# Patient Record
Sex: FEMALE | Race: WHITE | Employment: OTHER | URBAN - METROPOLITAN AREA
[De-identification: names, ages, dates, MRNs, and addresses within clinical notes are randomized per-mention and may not be internally consistent; named-entity substitution may affect disease eponyms.]

---

## 2021-01-22 ENCOUNTER — IMMUNIZATION (OUTPATIENT)
Dept: PRIMARY CARE CLINIC | Age: 86
End: 2021-01-22
Payer: MEDICARE

## 2021-01-22 PROCEDURE — 0001A COVID-19, PFIZER VACCINE 30MCG/0.3ML DOSE: CPT | Performed by: FAMILY MEDICINE

## 2021-01-22 PROCEDURE — 91300 COVID-19, PFIZER VACCINE 30MCG/0.3ML DOSE: CPT | Performed by: FAMILY MEDICINE

## 2021-02-12 ENCOUNTER — IMMUNIZATION (OUTPATIENT)
Dept: PRIMARY CARE CLINIC | Age: 86
End: 2021-02-12
Payer: MEDICARE

## 2021-02-12 PROCEDURE — 0002A COVID-19, PFIZER VACCINE 30MCG/0.3ML DOSE: CPT | Performed by: FAMILY MEDICINE

## 2021-02-12 PROCEDURE — 91300 COVID-19, PFIZER VACCINE 30MCG/0.3ML DOSE: CPT | Performed by: FAMILY MEDICINE

## 2022-01-17 ENCOUNTER — APPOINTMENT (OUTPATIENT)
Dept: GENERAL RADIOLOGY | Age: 87
DRG: 563 | End: 2022-01-17
Payer: MEDICARE

## 2022-01-17 ENCOUNTER — APPOINTMENT (OUTPATIENT)
Dept: CT IMAGING | Age: 87
DRG: 563 | End: 2022-01-17
Payer: MEDICARE

## 2022-01-17 ENCOUNTER — HOSPITAL ENCOUNTER (INPATIENT)
Age: 87
LOS: 2 days | Discharge: HOME HEALTH | DRG: 563 | End: 2022-01-19
Attending: STUDENT IN AN ORGANIZED HEALTH CARE EDUCATION/TRAINING PROGRAM | Admitting: PEDIATRICS
Payer: MEDICARE

## 2022-01-17 DIAGNOSIS — S42.212A: ICD-10-CM

## 2022-01-17 DIAGNOSIS — R41.3 MEMORY PROBLEM: ICD-10-CM
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
## 2022-01-17 DIAGNOSIS — R26.81 GAIT INSTABILITY: ICD-10-CM

## 2022-01-17 PROBLEM — T14.8XXA BONE FRACTURE: Status: ACTIVE | Noted: 2022-01-17

## 2022-01-17 PROBLEM — S42.202A CLOSED FRACTURE OF PROXIMAL END OF LEFT HUMERUS, INITIAL ENCOUNTER: Status: ACTIVE | Noted: 2022-01-17

## 2022-01-17 LAB
ANION GAP SERPL CALCULATED.3IONS-SCNC: 13 MMOL/L (ref 3–16)
BASOPHILS ABSOLUTE: 0 K/UL (ref 0–0.2)
BASOPHILS RELATIVE PERCENT: 0.2 %
BILIRUBIN URINE: NEGATIVE
BLOOD, URINE: NEGATIVE
BUN BLDV-MCNC: 24 MG/DL (ref 7–20)
CALCIUM SERPL-MCNC: 9.3 MG/DL (ref 8.3–10.6)
CHLORIDE BLD-SCNC: 95 MMOL/L (ref 99–110)
CLARITY: CLEAR
CO2: 26 MMOL/L (ref 21–32)
COLOR: YELLOW
CREAT SERPL-MCNC: 0.9 MG/DL (ref 0.6–1.2)
EKG ATRIAL RATE: 65 BPM
EKG DIAGNOSIS: NORMAL
EKG P AXIS: 43 DEGREES
EKG P-R INTERVAL: 234 MS
EKG Q-T INTERVAL: 468 MS
EKG QRS DURATION: 126 MS
EKG QTC CALCULATION (BAZETT): 486 MS
EKG R AXIS: -59 DEGREES
EKG T AXIS: 85 DEGREES
EKG VENTRICULAR RATE: 65 BPM
EOSINOPHILS ABSOLUTE: 0.1 K/UL (ref 0–0.6)
EOSINOPHILS RELATIVE PERCENT: 1.1 %
GFR AFRICAN AMERICAN: >60
GFR NON-AFRICAN AMERICAN: 59
GLUCOSE BLD-MCNC: 115 MG/DL (ref 70–99)
GLUCOSE URINE: NEGATIVE MG/DL
HCT VFR BLD CALC: 36.2 % (ref 36–48)
HEMOGLOBIN: 11.8 G/DL (ref 12–16)
KETONES, URINE: NEGATIVE MG/DL
LEUKOCYTE ESTERASE, URINE: NEGATIVE
LYMPHOCYTES ABSOLUTE: 0.6 K/UL (ref 1–5.1)
LYMPHOCYTES RELATIVE PERCENT: 6.8 %
MCH RBC QN AUTO: 30.9 PG (ref 26–34)
MCHC RBC AUTO-ENTMCNC: 32.6 G/DL (ref 31–36)
MCV RBC AUTO: 94.8 FL (ref 80–100)
MICROSCOPIC EXAMINATION: NORMAL
MONOCYTES ABSOLUTE: 0.9 K/UL (ref 0–1.3)
MONOCYTES RELATIVE PERCENT: 9.9 %
NEUTROPHILS ABSOLUTE: 7.1 K/UL (ref 1.7–7.7)
NEUTROPHILS RELATIVE PERCENT: 82 %
NITRITE, URINE: NEGATIVE
PDW BLD-RTO: 16.3 % (ref 12.4–15.4)
PH UA: 7 (ref 5–8)
PLATELET # BLD: 265 K/UL (ref 135–450)
PMV BLD AUTO: 6.8 FL (ref 5–10.5)
POTASSIUM REFLEX MAGNESIUM: 3.8 MMOL/L (ref 3.5–5.1)
PROTEIN UA: NEGATIVE MG/DL
RBC # BLD: 3.82 M/UL (ref 4–5.2)
SODIUM BLD-SCNC: 134 MMOL/L (ref 136–145)
SPECIFIC GRAVITY UA: 1.02 (ref 1–1.03)
TROPONIN: <0.01 NG/ML
URINE REFLEX TO CULTURE: NORMAL
URINE TYPE: NORMAL
UROBILINOGEN, URINE: 1 E.U./DL
WBC # BLD: 8.7 K/UL (ref 4–11)

## 2022-01-17 PROCEDURE — 6370000000 HC RX 637 (ALT 250 FOR IP): Performed by: NURSE PRACTITIONER

## 2022-01-17 PROCEDURE — 99284 EMERGENCY DEPT VISIT MOD MDM: CPT

## 2022-01-17 PROCEDURE — 94640 AIRWAY INHALATION TREATMENT: CPT

## 2022-01-17 PROCEDURE — 2580000003 HC RX 258: Performed by: PEDIATRICS

## 2022-01-17 PROCEDURE — 93005 ELECTROCARDIOGRAM TRACING: CPT | Performed by: STUDENT IN AN ORGANIZED HEALTH CARE EDUCATION/TRAINING PROGRAM

## 2022-01-17 PROCEDURE — 81003 URINALYSIS AUTO W/O SCOPE: CPT

## 2022-01-17 PROCEDURE — 71045 X-RAY EXAM CHEST 1 VIEW: CPT

## 2022-01-17 PROCEDURE — 85025 COMPLETE CBC W/AUTO DIFF WBC: CPT

## 2022-01-17 PROCEDURE — 6370000000 HC RX 637 (ALT 250 FOR IP): Performed by: PEDIATRICS

## 2022-01-17 PROCEDURE — 80048 BASIC METABOLIC PNL TOTAL CA: CPT

## 2022-01-17 PROCEDURE — 99221 1ST HOSP IP/OBS SF/LOW 40: CPT | Performed by: ORTHOPAEDIC SURGERY

## 2022-01-17 PROCEDURE — 73060 X-RAY EXAM OF HUMERUS: CPT

## 2022-01-17 PROCEDURE — 70450 CT HEAD/BRAIN W/O DYE: CPT

## 2022-01-17 PROCEDURE — 84484 ASSAY OF TROPONIN QUANT: CPT

## 2022-01-17 PROCEDURE — 73030 X-RAY EXAM OF SHOULDER: CPT

## 2022-01-17 PROCEDURE — 6370000000 HC RX 637 (ALT 250 FOR IP): Performed by: STUDENT IN AN ORGANIZED HEALTH CARE EDUCATION/TRAINING PROGRAM

## 2022-01-17 PROCEDURE — 6360000002 HC RX W HCPCS: Performed by: PEDIATRICS

## 2022-01-17 PROCEDURE — 1200000000 HC SEMI PRIVATE

## 2022-01-17 PROCEDURE — 93010 ELECTROCARDIOGRAM REPORT: CPT | Performed by: INTERNAL MEDICINE

## 2022-01-17 PROCEDURE — 72125 CT NECK SPINE W/O DYE: CPT

## 2022-01-17 RX ORDER — SENNOSIDES 8.6 MG
650 CAPSULE ORAL DAILY
Status: DISCONTINUED | OUTPATIENT
Start: 2022-01-18 | End: 2022-01-17 | Stop reason: ALTCHOICE

## 2022-01-17 RX ORDER — HYDROCODONE BITARTRATE AND ACETAMINOPHEN 5; 325 MG/1; MG/1
1 TABLET ORAL ONCE
Status: COMPLETED | OUTPATIENT
Start: 2022-01-17 | End: 2022-01-17

## 2022-01-17 RX ORDER — ALBUTEROL SULFATE 90 UG/1
2 POWDER, METERED RESPIRATORY (INHALATION) 4 TIMES DAILY PRN
COMMUNITY
Start: 2021-12-18

## 2022-01-17 RX ORDER — HYDROCHLOROTHIAZIDE 12.5 MG/1
12.5 CAPSULE, GELATIN COATED ORAL DAILY
Status: DISCONTINUED | OUTPATIENT
Start: 2022-01-18 | End: 2022-01-19 | Stop reason: HOSPADM

## 2022-01-17 RX ORDER — FOLIC ACID 1 MG/1
1000 TABLET ORAL DAILY
COMMUNITY
Start: 2021-12-30

## 2022-01-17 RX ORDER — SODIUM CHLORIDE 9 MG/ML
25 INJECTION, SOLUTION INTRAVENOUS PRN
Status: DISCONTINUED | OUTPATIENT
Start: 2022-01-17 | End: 2022-01-19 | Stop reason: HOSPADM

## 2022-01-17 RX ORDER — HYDROCODONE BITARTRATE AND ACETAMINOPHEN 5; 325 MG/1; MG/1
1 TABLET ORAL EVERY 4 HOURS PRN
Status: DISCONTINUED | OUTPATIENT
Start: 2022-01-17 | End: 2022-01-19 | Stop reason: HOSPADM

## 2022-01-17 RX ORDER — MORPHINE SULFATE 2 MG/ML
2 INJECTION, SOLUTION INTRAMUSCULAR; INTRAVENOUS EVERY 4 HOURS PRN
Status: DISCONTINUED | OUTPATIENT
Start: 2022-01-17 | End: 2022-01-19 | Stop reason: HOSPADM

## 2022-01-17 RX ORDER — ACETAMINOPHEN 325 MG/1
650 TABLET ORAL EVERY 8 HOURS SCHEDULED
Status: DISCONTINUED | OUTPATIENT
Start: 2022-01-17 | End: 2022-01-19 | Stop reason: HOSPADM

## 2022-01-17 RX ORDER — ONDANSETRON 2 MG/ML
4 INJECTION INTRAMUSCULAR; INTRAVENOUS EVERY 6 HOURS PRN
Status: DISCONTINUED | OUTPATIENT
Start: 2022-01-17 | End: 2022-01-19 | Stop reason: HOSPADM

## 2022-01-17 RX ORDER — DOCUSATE SODIUM 100 MG/1
100 CAPSULE, LIQUID FILLED ORAL DAILY
Status: DISCONTINUED | OUTPATIENT
Start: 2022-01-17 | End: 2022-01-19 | Stop reason: HOSPADM

## 2022-01-17 RX ORDER — PREDNISONE 1 MG/1
2.5 TABLET ORAL EVERY OTHER DAY
Status: DISCONTINUED | OUTPATIENT
Start: 2022-01-18 | End: 2022-01-17

## 2022-01-17 RX ORDER — BUDESONIDE AND FORMOTEROL FUMARATE DIHYDRATE 160; 4.5 UG/1; UG/1
2 AEROSOL RESPIRATORY (INHALATION) 2 TIMES DAILY
Status: DISCONTINUED | OUTPATIENT
Start: 2022-01-17 | End: 2022-01-19 | Stop reason: HOSPADM

## 2022-01-17 RX ORDER — PREDNISONE 2.5 MG
2.5 TABLET ORAL EVERY OTHER DAY
Status: ON HOLD | COMMUNITY
Start: 2021-07-15 | End: 2022-10-14 | Stop reason: SDUPTHER

## 2022-01-17 RX ORDER — VITAMIN B COMPLEX
2000 TABLET ORAL DAILY
Status: DISCONTINUED | OUTPATIENT
Start: 2022-01-17 | End: 2022-01-19 | Stop reason: HOSPADM

## 2022-01-17 RX ORDER — ACETAMINOPHEN 325 MG/1
650 TABLET ORAL EVERY 6 HOURS PRN
Status: DISCONTINUED | OUTPATIENT
Start: 2022-01-17 | End: 2022-01-19 | Stop reason: HOSPADM

## 2022-01-17 RX ORDER — ACETAMINOPHEN 650 MG/1
650 SUPPOSITORY RECTAL EVERY 6 HOURS PRN
Status: DISCONTINUED | OUTPATIENT
Start: 2022-01-17 | End: 2022-01-19 | Stop reason: HOSPADM

## 2022-01-17 RX ORDER — ONDANSETRON 4 MG/1
4 TABLET, ORALLY DISINTEGRATING ORAL EVERY 8 HOURS PRN
Status: DISCONTINUED | OUTPATIENT
Start: 2022-01-17 | End: 2022-01-19 | Stop reason: HOSPADM

## 2022-01-17 RX ORDER — SODIUM CHLORIDE 0.9 % (FLUSH) 0.9 %
5-40 SYRINGE (ML) INJECTION EVERY 12 HOURS SCHEDULED
Status: DISCONTINUED | OUTPATIENT
Start: 2022-01-17 | End: 2022-01-19 | Stop reason: HOSPADM

## 2022-01-17 RX ORDER — LANOLIN ALCOHOL/MO/W.PET/CERES
6 CREAM (GRAM) TOPICAL NIGHTLY PRN
Status: DISCONTINUED | OUTPATIENT
Start: 2022-01-17 | End: 2022-01-19 | Stop reason: HOSPADM

## 2022-01-17 RX ORDER — PREDNISONE 1 MG/1
2.5 TABLET ORAL EVERY OTHER DAY
Status: DISCONTINUED | OUTPATIENT
Start: 2022-01-17 | End: 2022-01-19 | Stop reason: HOSPADM

## 2022-01-17 RX ORDER — ACETAMINOPHEN 650 MG/1
650 SUPPOSITORY RECTAL EVERY 4 HOURS PRN
COMMUNITY
End: 2022-01-17

## 2022-01-17 RX ORDER — POLYETHYLENE GLYCOL 3350 17 G/17G
17 POWDER, FOR SOLUTION ORAL DAILY PRN
Status: DISCONTINUED | OUTPATIENT
Start: 2022-01-17 | End: 2022-01-19 | Stop reason: HOSPADM

## 2022-01-17 RX ORDER — ALBUTEROL SULFATE 90 UG/1
2 AEROSOL, METERED RESPIRATORY (INHALATION) 4 TIMES DAILY PRN
Status: DISCONTINUED | OUTPATIENT
Start: 2022-01-17 | End: 2022-01-19 | Stop reason: HOSPADM

## 2022-01-17 RX ORDER — FOLIC ACID 1 MG/1
1000 TABLET ORAL DAILY
Status: DISCONTINUED | OUTPATIENT
Start: 2022-01-18 | End: 2022-01-19 | Stop reason: HOSPADM

## 2022-01-17 RX ORDER — SODIUM CHLORIDE 9 MG/ML
INJECTION, SOLUTION INTRAVENOUS CONTINUOUS
Status: ACTIVE | OUTPATIENT
Start: 2022-01-17 | End: 2022-01-18

## 2022-01-17 RX ORDER — SODIUM CHLORIDE 0.9 % (FLUSH) 0.9 %
5-40 SYRINGE (ML) INJECTION PRN
Status: DISCONTINUED | OUTPATIENT
Start: 2022-01-17 | End: 2022-01-19 | Stop reason: HOSPADM

## 2022-01-17 RX ORDER — SENNOSIDES 8.6 MG
1300 CAPSULE ORAL
COMMUNITY

## 2022-01-17 RX ORDER — HYDROCHLOROTHIAZIDE 12.5 MG/1
12.5 CAPSULE, GELATIN COATED ORAL DAILY
COMMUNITY
Start: 2022-01-06

## 2022-01-17 RX ADMIN — Medication 6 MG: at 21:03

## 2022-01-17 RX ADMIN — BUDESONIDE AND FORMOTEROL FUMARATE DIHYDRATE 2 PUFF: 160; 4.5 AEROSOL RESPIRATORY (INHALATION) at 19:56

## 2022-01-17 RX ADMIN — Medication 1 TABLET: at 20:09

## 2022-01-17 RX ADMIN — SODIUM CHLORIDE, PRESERVATIVE FREE 10 ML: 5 INJECTION INTRAVENOUS at 20:39

## 2022-01-17 RX ADMIN — DOCUSATE SODIUM 100 MG: 100 CAPSULE ORAL at 20:09

## 2022-01-17 RX ADMIN — ENOXAPARIN SODIUM 40 MG: 100 INJECTION SUBCUTANEOUS at 20:09

## 2022-01-17 RX ADMIN — PREDNISONE 2.5 MG: 5 TABLET ORAL at 21:03

## 2022-01-17 RX ADMIN — SODIUM CHLORIDE: 9 INJECTION, SOLUTION INTRAVENOUS at 20:30

## 2022-01-17 RX ADMIN — ACETAMINOPHEN 650 MG: 325 TABLET ORAL at 20:10

## 2022-01-17 RX ADMIN — Medication 2000 UNITS: at 20:09

## 2022-01-17 RX ADMIN — HYDROCODONE BITARTRATE AND ACETAMINOPHEN 1 TABLET: 5; 325 TABLET ORAL at 13:35

## 2022-01-17 RX ADMIN — METHOTREXATE 17.5 MG: 2.5 TABLET ORAL at 20:09

## 2022-01-17 RX ADMIN — HYDROCODONE BITARTRATE AND ACETAMINOPHEN 1 TABLET: 5; 325 TABLET ORAL at 17:18

## 2022-01-17 RX ADMIN — HYDROCODONE BITARTRATE AND ACETAMINOPHEN 1 TABLET: 5; 325 TABLET ORAL at 21:42

## 2022-01-17 ASSESSMENT — PAIN SCALES - GENERAL
PAINLEVEL_OUTOF10: 8
PAINLEVEL_OUTOF10: 0
PAINLEVEL_OUTOF10: 6
PAINLEVEL_OUTOF10: 8
PAINLEVEL_OUTOF10: 5
PAINLEVEL_OUTOF10: 6
PAINLEVEL_OUTOF10: 5
PAINLEVEL_OUTOF10: 0
PAINLEVEL_OUTOF10: 8

## 2022-01-17 ASSESSMENT — PAIN DESCRIPTION - PAIN TYPE
TYPE: ACUTE PAIN

## 2022-01-17 ASSESSMENT — PAIN DESCRIPTION - LOCATION
LOCATION: ARM

## 2022-01-17 ASSESSMENT — PAIN DESCRIPTION - ORIENTATION
ORIENTATION: LEFT

## 2022-01-17 ASSESSMENT — PAIN DESCRIPTION - ONSET
ONSET: ON-GOING
ONSET: ON-GOING

## 2022-01-17 ASSESSMENT — PAIN DESCRIPTION - DESCRIPTORS
DESCRIPTORS: ACHING
DESCRIPTORS: ACHING
DESCRIPTORS: DISCOMFORT

## 2022-01-17 ASSESSMENT — PAIN - FUNCTIONAL ASSESSMENT
PAIN_FUNCTIONAL_ASSESSMENT: PREVENTS OR INTERFERES SOME ACTIVE ACTIVITIES AND ADLS
PAIN_FUNCTIONAL_ASSESSMENT: PREVENTS OR INTERFERES SOME ACTIVE ACTIVITIES AND ADLS

## 2022-01-17 ASSESSMENT — PAIN DESCRIPTION - FREQUENCY
FREQUENCY: CONTINUOUS
FREQUENCY: CONTINUOUS

## 2022-01-17 ASSESSMENT — PAIN DESCRIPTION - PROGRESSION
CLINICAL_PROGRESSION: NOT CHANGED
CLINICAL_PROGRESSION: NOT CHANGED

## 2022-01-17 NOTE — ED NOTES
Report called to 3rd floor. All questions answered. Transport placed.       Alexis Metcalf RN  01/17/22 2276

## 2022-01-17 NOTE — PROGRESS NOTES
Pharmacy Medication Reconciliation Note     List of medications patient is currently taking is complete. Source of information:   1. Talked to the patient at bedside. 2. Dispensed records. Notes regarding home medications: 1. Patient took her tylenol 650 mg tab, hydrochlorthiazide 12.5 and ADVAIR prior to arrival in emergency department. 2. Patient takes her Methotrexate on Mondays - she has not taken it yet today.       Smiley Garcia, PharmD  1/17/2022  4:59 PM

## 2022-01-17 NOTE — ED NOTES
Fall risk screening completed. Fall risk bracelet applied to patient. Bed alarm on and activated. The fall risk is indicated using dome light . The call light is within the patient's reach and instructions for use were provided. The bed is in the lowest position with wheels locked. The patient has been advised to notify staff using the call light if there is a need to get up or use restroom. The patient verbalized understanding of safety precautions and how to contact staff for assistance.         Kirsten Chandler RN  01/17/22 9649

## 2022-01-17 NOTE — ED TRIAGE NOTES
pt to ED via squad from home with daughter, pt states she didnt  her foot enough and fell. c/o left arm pain. pt walks with a walker. pt was given 50 mcg fentanyl intranasally and 50 mcg IM.

## 2022-01-17 NOTE — CONSULTS
[facility] Orthopedic Surgery  Consult Note    This patient is seen in consultation at the request of Dr Efren Morris    Reason for Consult:  Left proximal humerus fx    CHIEF COMPLAINT:  Left shoulder pain    History Obtained From:  patient, electronic medical record    HISTORY OF PRESENT ILLNESS:    The patient is a 80 y.o. female who presents with left shoulder pain after a fall in her home today. She has poor recall of events. Daughter at bedside. . Pain is described in left shoulder and with the intensity of moderate. Pain is described as aching. Discomfort is persistent. She is alert and oriented. NO other complaints. ,     Past Medical History:    History reviewed. No pertinent past medical history. Past Surgical History:    History reviewed. No pertinent surgical history. Social History     Tobacco Use    Smoking status: Never Smoker    Smokeless tobacco: Never Used   Substance Use Topics    Alcohol use: Yes     Comment: occ       History reviewed. No pertinent family history. Current Medications:   No current facility-administered medications for this encounter. Allergies:  @    REVIEW OF SYSTEMS:    CONSTITUTIONAL:  negative for  fevers, chills and malaise  MUSCULOSKELETAL:  positive for  myalgias, arthralgias and pain  All other ROS reviewed in chart or with patient or family and are grossly negative. PHYSICAL EXAM:    VITALS:  /61   Pulse 74   Temp 98.2 °F (36.8 °C) (Oral)   Resp 16   SpO2 98%     MUSCULOSKELETAL: Left wrist with intact flex/ext and hand grasp and extension and thumbs up. Left shoulder with notable effusion, no gross deformity. NOntender right shoulder or bilateral elbows or wrist. Nontender bilateral knees and ankles.    NEUROLOGIC:   Sensory:    Touch:                     Right Upper Extremity:  normal                   Left Upper Extremity:  normal                  Skin warm and dry  Resp deep and easy  Abdomen soft and round  Pulse is with regular rate and rhythm    DATA:    CBC:   Lab Results   Component Value Date    WBC 8.7 01/17/2022    RBC 3.82 01/17/2022    HGB 11.8 01/17/2022    HCT 36.2 01/17/2022    MCV 94.8 01/17/2022    MCH 30.9 01/17/2022    MCHC 32.6 01/17/2022    RDW 16.3 01/17/2022     01/17/2022    MPV 6.8 01/17/2022     WBC:    Lab Results   Component Value Date    WBC 8.7 01/17/2022     PT/INR:  No results found for: PROTIME, INR  PTT:  No results found for: APTT[APTT    Radiology Review:    Narrative   EXAMINATION:   TWO XRAY VIEWS OF THE LEFT HUMERUS       1/17/2022 1:19 pm       COMPARISON:   Left shoulder of the same date. HISTORY:   ORDERING SYSTEM PROVIDED HISTORY: L humerus ttp s/p fall   TECHNOLOGIST PROVIDED HISTORY:   Reason for exam:->L humerus ttp s/p fall   Reason for Exam: L humerus ttp s/p fall       FINDINGS:   There is redemonstration of a left humeral neck fracture. No other humeral   fracture is identified. Diffuse osteopenia. No focal soft tissue abnormality           Impression   Left humeral neck fracture. No other acute humeral fracture identified.                IMPRESSION/RECOMMENDATIONS:    Fall at home  Left shoulder pain  Left proximal humerus fracture  Recommend nonoperative treatment for now  Plan NWB in sling left arm  No shoulder ROM for now  BID elbow, forearm, wrist, finger ROM exercises   Handout of exercises given  PT OT to see  SS for Arnoldo Ojeda 1485, APRN - CNP  1/17/2022  4:28 PM      SY Addendum:  I have seen and examined Joel Coreas at bedside  Family member present  Edits made to note and A/P above, otherwise I agree  FU in 1-2 weeks with myself in the office for repeat left shoulder XRs to ensure stability    Karime Gonzalez MD

## 2022-01-17 NOTE — H&P
Hospital Medicine History & Physical      PCP: Alfredo Buitrago    Date of Admission: 1/17/2022    Date of Service: Pt seen/examined on 01/17/22  and Admitted to Inpatient with expected LOS greater than two midnights due to medical therapy. Chief Complaint:  Fall       History Of Present Illness:      80 y.o. female who presented to [facility] with report that she fell down today - subsequently with left shoulder and arm pain, also head pain. She does not recall what time she fell down, nor does she recall the manner of the fall. But she also does not think that she was unconscious at any time. Her daughter reports that she fell down about 1030 this morning. She called her daughter for help - her daughter called am ambulance and she was brought to [facility] ED. Reports shoulder pain of 8/10. No blood in stool known. Denies dark or melanotic stools. Denies recent changes to medication regimen. RN in the ED concerned about her ability to swallow well as she was coughing after trying to take pills. FamHx:   - daughter recently recovering from covid     SocHx:   - does not drink   - rarely drinks alcohol   - lives with a daughter Cody Michael)       Past Medical History:      History reviewed. No pertinent past medical history. Past Surgical History:      History reviewed. No pertinent surgical history. Medications Prior to Admission:      Prior to Admission medications    Not on File       Allergies:  Hydrogen peroxide, Lisinopril, and Neomycin-bacitracin zn-polymyx    Social History:      The patient currently lives at home with her daughter     TOBACCO:   reports that she has never smoked. She has never used smokeless tobacco.  ETOH:   reports current alcohol use. E-Cigarettes/Vaping Use     Questions Responses    E-Cigarette/Vaping Use     Start Date     Passive Exposure     Quit Date     Counseling Given     Comments             Family History:         History reviewed.  No pertinent family history. REVIEW OF SYSTEMS COMPLETED:   Pertinent positives as noted in the HPI. All other systems reviewed and negative. PHYSICAL EXAM PERFORMED:    /61   Pulse 74   Temp 98.2 °F (36.8 °C) (Oral)   Resp 16   SpO2 98%     General appearance:  No apparent distress, appears stated age and cooperative. HEENT:  Normal cephalic, atraumatic without obvious deformity. Pupils equal, round, and reactive to light. Extra ocular muscles intact. Conjunctivae/corneas clear. Neck: Supple, with full range of motion. No jugular venous distention. Trachea midline. Respiratory:  Normal respiratory effort. Clear to auscultation, bilaterally without Rales/Wheezes/Rhonchi. Cardiovascular:  irRegular rate and rhythm with normal S1/S2 without murmurs, rubs or gallops. Abdomen: Soft, non-tender, non-distended    Musculoskeletal:  No clubbing, cyanosis or edema bilaterally. Left arm in a sling     Skin: Skin color, texture, turgor normal.  No rashes or lesions. Neurologic:    Speech clear   No facial droop  Moves ext x 4     Left arm in a sling   Psychiatric:  Alert and oriented   Capillary Refill: Brisk,3 seconds, normal  Peripheral Pulses: +2 palpable, equal bilaterally       Labs:     Recent Labs     01/17/22  1358   WBC 8.7   HGB 11.8*   HCT 36.2        Recent Labs     01/17/22  1358   *   K 3.8   CL 95*   CO2 26   BUN 24*   CREATININE 0.9   CALCIUM 9.3     No results for input(s): AST, ALT, BILIDIR, BILITOT, ALKPHOS in the last 72 hours. No results for input(s): INR in the last 72 hours. Recent Labs     01/17/22  1358   TROPONINI <0.01       Urinalysis:      Lab Results   Component Value Date    NITRU Negative 01/17/2022    BLOODU Negative 01/17/2022    SPECGRAV 1.019 01/17/2022    GLUCOSEU Negative 01/17/2022       Radiology:          XR SHOULDER LEFT (MIN 2 VIEWS)   Final Result   Acute left proximal humeral neck and head fracture. No dislocation.   Limited   positioning of the left shoulder likely due to patient factors         XR HUMERUS LEFT (MIN 2 VIEWS)   Final Result   Left humeral neck fracture. No other acute humeral fracture identified. XR CHEST PORTABLE   Final Result   Moderate interstitial fibrotic changes in the lungs. No pneumothorax. No displaced rib fractures. CT HEAD WO CONTRAST   Final Result   No acute intracranial abnormality. CT CERVICAL SPINE WO CONTRAST   Final Result   No acute abnormality of the cervical spine. ASSESSMENT:    Active Hospital Problems    Diagnosis Date Noted    Bone fracture [T14. Valene Folds 01/17/2022     Estephania Redmond is a 80 y.o. female   - daughter with concern for pain control advocated for admission       Left humerus fracture s/p fall:   - PT/OT   - ortho consulted   - norco 5-325 q4 hrs prn 1st line, morphine 2nd line   - docusate 100 daily with pain meds     Recurrent falls, in context of rheumatoid arthritis:   - in the context of rheumatoid arthritis - on methotrexate 17.5 mg po q week   - prednisone 2.5 mg po qod (next dose due today 1/17/22)   - PT/OT consulted   - CT head and C-spine 1/17 negative for acute abnormality   - fall precautions   - case management (discharge planning)     Hyponatremic / azotemia:   - probable dehydration (although low sodium maybe just from thiazide)   - IVF support (NS x 1000 ml, 100 ml/hr x 10 hrs)   - follow sodium and BUN     Hypertension:   - hctz 12.5 mg po daily    - maybe consider transitioning to amlodipine in the future - if issues with hyponatremia ongoing     Conduction, heart block, LBBB:   - telemetry x 24 hours due to fall     Normocytic anemia:   - hemoccult stool   - elevated RDW - check retic count, b12   - follow up CBC in AM   - does not know timing of last colonoscopy    - rheumatologst with plan to start iron per daughter's report     Probable CKD:   - est gfr of 59   - monitor cr / avoid nephrotoxins as able     Concern for dysphagia:   - wet cough s/p trying to swallow pills   - daughter reports she eats well - so SLP eval on hold, but low threshold to order if further concerns from RN staff   - denies hx of stroke     Probable MCI:   - daughter describes her as \"forgetful\" and expresses concerns about decline to her mental status if pain not controlled and if difficulty sleeping   - pain meds as noted above   - melatonin 6 mg po qhs prn sleep aid   - tried to explain limitations to visitor policy due to covid     DVT Prophylaxis: enoxaparin   Diet: No diet orders on file  Code Status:   DNR-CCA   - alternative decision maker - Hernando Abebe (daughter, POA)     PT/OT Eval Status: consulted     Dispo - pending improvement / placement        Torrie Cm MD    Thank you Sinan Alexander for the opportunity to be involved in this patient's care.

## 2022-01-18 LAB
ANION GAP SERPL CALCULATED.3IONS-SCNC: 11 MMOL/L (ref 3–16)
BUN BLDV-MCNC: 19 MG/DL (ref 7–20)
CALCIUM SERPL-MCNC: 8.8 MG/DL (ref 8.3–10.6)
CHLORIDE BLD-SCNC: 98 MMOL/L (ref 99–110)
CO2: 24 MMOL/L (ref 21–32)
CREAT SERPL-MCNC: 0.7 MG/DL (ref 0.6–1.2)
FERRITIN: 282.3 NG/ML (ref 15–150)
GFR AFRICAN AMERICAN: >60
GFR NON-AFRICAN AMERICAN: >60
GLUCOSE BLD-MCNC: 109 MG/DL (ref 70–99)
HCT VFR BLD CALC: 32.6 % (ref 36–48)
HEMOGLOBIN: 10.9 G/DL (ref 12–16)
IMMATURE RETIC FRACT: 0.37 (ref 0.21–0.37)
MCH RBC QN AUTO: 31.7 PG (ref 26–34)
MCHC RBC AUTO-ENTMCNC: 33.5 G/DL (ref 31–36)
MCV RBC AUTO: 94.6 FL (ref 80–100)
PDW BLD-RTO: 16.2 % (ref 12.4–15.4)
PLATELET # BLD: 216 K/UL (ref 135–450)
PMV BLD AUTO: 7.1 FL (ref 5–10.5)
POTASSIUM REFLEX MAGNESIUM: 3.8 MMOL/L (ref 3.5–5.1)
RBC # BLD: 3.45 M/UL (ref 4–5.2)
RETICULOCYTE ABSOLUTE COUNT: 0.04 M/UL (ref 0.02–0.1)
RETICULOCYTE COUNT PCT: 1.16 % (ref 0.5–2.18)
SODIUM BLD-SCNC: 133 MMOL/L (ref 136–145)
WBC # BLD: 7.3 K/UL (ref 4–11)

## 2022-01-18 PROCEDURE — 85027 COMPLETE CBC AUTOMATED: CPT

## 2022-01-18 PROCEDURE — 82728 ASSAY OF FERRITIN: CPT

## 2022-01-18 PROCEDURE — 97535 SELF CARE MNGMENT TRAINING: CPT

## 2022-01-18 PROCEDURE — 97530 THERAPEUTIC ACTIVITIES: CPT

## 2022-01-18 PROCEDURE — 2580000003 HC RX 258: Performed by: PEDIATRICS

## 2022-01-18 PROCEDURE — 85045 AUTOMATED RETICULOCYTE COUNT: CPT

## 2022-01-18 PROCEDURE — 1200000000 HC SEMI PRIVATE

## 2022-01-18 PROCEDURE — 6370000000 HC RX 637 (ALT 250 FOR IP): Performed by: NURSE PRACTITIONER

## 2022-01-18 PROCEDURE — 97162 PT EVAL MOD COMPLEX 30 MIN: CPT

## 2022-01-18 PROCEDURE — 94761 N-INVAS EAR/PLS OXIMETRY MLT: CPT

## 2022-01-18 PROCEDURE — 80048 BASIC METABOLIC PNL TOTAL CA: CPT

## 2022-01-18 PROCEDURE — 97116 GAIT TRAINING THERAPY: CPT

## 2022-01-18 PROCEDURE — 6360000002 HC RX W HCPCS: Performed by: PEDIATRICS

## 2022-01-18 PROCEDURE — 6370000000 HC RX 637 (ALT 250 FOR IP): Performed by: PEDIATRICS

## 2022-01-18 PROCEDURE — 97166 OT EVAL MOD COMPLEX 45 MIN: CPT

## 2022-01-18 PROCEDURE — 94640 AIRWAY INHALATION TREATMENT: CPT

## 2022-01-18 PROCEDURE — 36415 COLL VENOUS BLD VENIPUNCTURE: CPT

## 2022-01-18 PROCEDURE — 99231 SBSQ HOSP IP/OBS SF/LOW 25: CPT | Performed by: NURSE PRACTITIONER

## 2022-01-18 RX ORDER — HYDROCODONE BITARTRATE AND ACETAMINOPHEN 5; 325 MG/1; MG/1
1 TABLET ORAL EVERY 4 HOURS PRN
Qty: 30 TABLET | Refills: 0 | Status: SHIPPED | OUTPATIENT
Start: 2022-01-18 | End: 2022-01-23

## 2022-01-18 RX ADMIN — SODIUM CHLORIDE, PRESERVATIVE FREE 10 ML: 5 INJECTION INTRAVENOUS at 09:09

## 2022-01-18 RX ADMIN — HYDROCHLOROTHIAZIDE 12.5 MG: 12.5 CAPSULE ORAL at 09:08

## 2022-01-18 RX ADMIN — HYDROCODONE BITARTRATE AND ACETAMINOPHEN 1 TABLET: 5; 325 TABLET ORAL at 10:38

## 2022-01-18 RX ADMIN — SODIUM CHLORIDE, PRESERVATIVE FREE 10 ML: 5 INJECTION INTRAVENOUS at 21:12

## 2022-01-18 RX ADMIN — FOLIC ACID 1000 MCG: 1 TABLET ORAL at 09:07

## 2022-01-18 RX ADMIN — HYDROCODONE BITARTRATE AND ACETAMINOPHEN 1 TABLET: 5; 325 TABLET ORAL at 23:54

## 2022-01-18 RX ADMIN — ENOXAPARIN SODIUM 40 MG: 100 INJECTION SUBCUTANEOUS at 09:08

## 2022-01-18 RX ADMIN — Medication 1 TABLET: at 09:08

## 2022-01-18 RX ADMIN — HYDROCODONE BITARTRATE AND ACETAMINOPHEN 1 TABLET: 5; 325 TABLET ORAL at 15:25

## 2022-01-18 RX ADMIN — Medication 2000 UNITS: at 09:08

## 2022-01-18 RX ADMIN — ACETAMINOPHEN 650 MG: 325 TABLET ORAL at 19:49

## 2022-01-18 RX ADMIN — DOCUSATE SODIUM 100 MG: 100 CAPSULE ORAL at 09:07

## 2022-01-18 RX ADMIN — ACETAMINOPHEN 650 MG: 325 TABLET ORAL at 05:38

## 2022-01-18 RX ADMIN — BUDESONIDE AND FORMOTEROL FUMARATE DIHYDRATE 2 PUFF: 160; 4.5 AEROSOL RESPIRATORY (INHALATION) at 20:42

## 2022-01-18 RX ADMIN — HYDROCODONE BITARTRATE AND ACETAMINOPHEN 1 TABLET: 5; 325 TABLET ORAL at 03:29

## 2022-01-18 ASSESSMENT — PAIN DESCRIPTION - FREQUENCY
FREQUENCY: CONTINUOUS

## 2022-01-18 ASSESSMENT — PAIN DESCRIPTION - PAIN TYPE
TYPE: ACUTE PAIN

## 2022-01-18 ASSESSMENT — PAIN - FUNCTIONAL ASSESSMENT
PAIN_FUNCTIONAL_ASSESSMENT: PREVENTS OR INTERFERES SOME ACTIVE ACTIVITIES AND ADLS

## 2022-01-18 ASSESSMENT — PAIN SCALES - GENERAL
PAINLEVEL_OUTOF10: 4
PAINLEVEL_OUTOF10: 4
PAINLEVEL_OUTOF10: 0
PAINLEVEL_OUTOF10: 0
PAINLEVEL_OUTOF10: 6
PAINLEVEL_OUTOF10: 0
PAINLEVEL_OUTOF10: 2
PAINLEVEL_OUTOF10: 0
PAINLEVEL_OUTOF10: 5
PAINLEVEL_OUTOF10: 3
PAINLEVEL_OUTOF10: 3

## 2022-01-18 ASSESSMENT — PAIN DESCRIPTION - DESCRIPTORS
DESCRIPTORS: ACHING;DISCOMFORT
DESCRIPTORS: ACHING;DISCOMFORT
DESCRIPTORS: ACHING
DESCRIPTORS: ACHING;DISCOMFORT
DESCRIPTORS: ACHING;DISCOMFORT;SORE

## 2022-01-18 ASSESSMENT — PAIN DESCRIPTION - LOCATION
LOCATION: ARM
LOCATION: SHOULDER
LOCATION: ARM
LOCATION: SHOULDER

## 2022-01-18 ASSESSMENT — PAIN SCALES - WONG BAKER
WONGBAKER_NUMERICALRESPONSE: 2
WONGBAKER_NUMERICALRESPONSE: 0
WONGBAKER_NUMERICALRESPONSE: 2
WONGBAKER_NUMERICALRESPONSE: 0

## 2022-01-18 ASSESSMENT — PAIN DESCRIPTION - ORIENTATION
ORIENTATION: LEFT

## 2022-01-18 ASSESSMENT — PAIN DESCRIPTION - PROGRESSION
CLINICAL_PROGRESSION: GRADUALLY WORSENING
CLINICAL_PROGRESSION: NOT CHANGED
CLINICAL_PROGRESSION: GRADUALLY WORSENING
CLINICAL_PROGRESSION: GRADUALLY IMPROVING
CLINICAL_PROGRESSION: GRADUALLY IMPROVING
CLINICAL_PROGRESSION: NOT CHANGED
CLINICAL_PROGRESSION: GRADUALLY IMPROVING

## 2022-01-18 ASSESSMENT — PAIN DESCRIPTION - ONSET
ONSET: ON-GOING

## 2022-01-18 NOTE — CARE COORDINATION
[facility]    Referral received from CM to follow for home care services. I will follow for needs, and speak with patient to verify demos.     Brain Daily RN, BSN CTN  Critical access hospital [phone]

## 2022-01-18 NOTE — PROGRESS NOTES
Pt A&Ox3, disoriented to time. Pt resting in chair with family member at bedside. Tolerating intake and PO medications whole with thin liquids. NWB L arm; in sling. Call light within reach. Fall precautions in place. Will monitor per unit protocol.      Electronically signed by Dejan Perez RN on 1/18/2022 at 9:52 AM

## 2022-01-18 NOTE — ACP (ADVANCE CARE PLANNING)
Advance Care Planning     Advance Care Planning Activator (Inpatient)  Conversation Note      Date of ACP Conversation: 1/18/2022     Conversation Conducted with: Patient with Decision Making Capacity    ACP Activator: [address] Decision Maker:     Current Designated Health Care Decision Maker:     Primary Decision Maker: Darianyomaira Holguin 53 - [phone]    Secondary Decision Maker: Jose Miller - [facility] [place] - [phone]  Click here to complete Healthcare Decision Makers including section of the Healthcare Decision Maker Relationship (ie \"Primary\")      Care Preferences    Ventilation: \"If you were in your present state of health and suddenly became very ill and were unable to breathe on your own, what would your preference be about the use of a ventilator (breathing machine) if it were available to you? \"      Would the patient desire the use of ventilator (breathing machine)?: yes    \"If your health worsens and it becomes clear that your chance of recovery is unlikely, what would your preference be about the use of a ventilator (breathing machine) if it were available to you? \"     Would the patient desire the use of ventilator (breathing machine)?: No      Resuscitation  \"CPR works best to restart the heart when there is a sudden event, like a heart attack, in someone who is otherwise healthy. Unfortunately, CPR does not typically restart the heart for people who have serious health conditions or who are very sick. \"    \"In the event your heart stopped as a result of an underlying serious health condition, would you want attempts to be made to restart your heart (answer \"yes\" for attempt to resuscitate) or would you prefer a natural death (answer \"no\" for do not attempt to resuscitate)? \" yes       [x] Yes   [] No   Educated Patient / Velasquez Hintont regarding differences between Advance Directives and portable DNR orders.     Length of ACP Conversation in minutes: Conversation Outcomes:  [x] ACP discussion completed  [] Existing advance directive reviewed with patient; no changes to patient's previously recorded wishes  [] New Advance Directive completed  [] Portable Do Not Rescitate prepared for Provider review and signature  [] POLST/POST/MOLST/MOST prepared for Provider review and signature      Follow-up plan:    [] Schedule follow-up conversation to continue planning  [] Referred individual to Provider for additional questions/concerns   [] Advised patient/agent/surrogate to review completed ACP document and update if needed with changes in condition, patient preferences or care setting    [x] This note routed to one or more involved healthcare providers        Electronically signed by Sudha Iqbal on 1/18/2022 at 12:38 PM  #[phone]

## 2022-01-18 NOTE — PROGRESS NOTES
Physical Therapy    Facility/Department: [address] ORTHOPEDICS  Initial Assessment    NAME: Karen Norris  : 3/2/1933  MRN: [id number]    Date of Service: 2022    Assessment / Discharge Recommendations:  -if she has 24 hour assist and supervision anticipate able to discharge to home with Home PTOT   -daughter states 5 siblings having discussions. ..  -equipment needs: quad cane, bedside commode (Home OT to determine shower chair vs tub transfer bench)    -if not, will need continued PT OT and nursing care in a skilled setting   -she is a High Fall Risk     Karen Norris scored a  on the AM-PAC short mobility form. Body structures, Functions, Activity limitations: Decreased functional mobility ; Decreased balance;Decreased ADL status; Decreased endurance; Increased pain  Prognosis: Good  Decision Making: Medium Complexity  REQUIRES PT FOLLOW UP: Yes  Activity Tolerance  Activity Tolerance: Patient limited by endurance; Patient limited by pain       Patient Diagnosis(es): The primary encounter diagnosis was Fall, initial encounter. Diagnoses of Fracture of neck of left humerus, closed, initial encounter, Memory problem, and Gait instability were also pertinent to this visit. has a past medical history of Hypertension, Pulmonary fibrosis ([address] Rheumatoid arteritis ([facility] [address] and Vitamin D deficiency. has no past surgical history on file. Restrictions  Restrictions/Precautions  Restrictions/Precautions: Fall Risk  Position Activity Restriction  Other position/activity restrictions: NWB left UE   -sling left UE.  No shoulder ROM  Vision/Hearing  Vision: Impaired  Vision Exceptions: Wears glasses at all times  Hearing: Exceptions to [facility]  Hearing Exceptions: Hard of hearing/hearing concerns     Subjective  General  Chart Reviewed: Yes  Patient assessed for rehabilitation services?: Yes  Additional Pertinent Hx: here due to fall at home yielding left proximal humeral fracture    PMH includes RA and spinal stenosis  Response To Previous Treatment: Not applicable  Family / Caregiver Present: Yes (daughter)  Follows Commands: Within Functional Limits  Subjective  Subjective: arrived to room to patient eating her breakfast -she is alert and oriented to self and general situation  - agreeable to PT OT assessments and OOB to demonstrate her mobility  - rates pain at rest at ~3/10  Social/Functional History  Social/Functional History  Lives With: Daughter  Type of Home: House  Home Layout: Multi-level,Able to Live on Main level with bedroom/bathroom (bed and bath on main level. kitchen and living space up 3 steps)  Home Access: Stairs to enter without rails  Entrance Stairs - Number of Steps: 3+1+1+3 or full flight from basement to main level with one rail (R)  Entrance Stairs - Rails: None  Bathroom Shower/Tub: Tub/Shower unit,Walk-in shower  Bathroom Toilet: Standard  Home Equipment: Cane,Rolling walker  ADL Assistance: Independent  Homemaking Assistance: Needs assistance (dtr does most. pt empty dishes)  Ambulation Assistance: Independent (cane PRN (RW if back is bothering her). Holds onto family in community)  Transfer Assistance: Independent  Additional Comments: fall brought here. No other fall. sleeps in regular bed  Objective  ROM and strength uninvolved limbs grossly functional as observed mobilizing from bed   Motor Control  Gross Motor?: WFL  Sensation  Overall Sensation Status:  (grossly wfl - not assessed formally in distal LEs)  Bed mobility  Supine to Sit: Moderate assistance (use of bed rail)  Sit to Supine: Unable to assess  Scooting:  Moderate assistance  Transfers  Sit to Stand: Minimal Assistance  Stand to sit: Minimal Assistance  Ambulation  Ambulation?: Yes  Ambulation 1  Surface: level tile  Device: Small Austen Ramiro  Assistance: Minimal assistance  Quality of Gait: wide base of support but with small step length and limited step height  Distance: ~40 feet overall (~20 feet x 2)  Comments: unsteady  Stairs/Curb  Stairs?: No  Balance  Comments: midline in sitting at the EOB and in static stance with lean on the quad cane and hands on min assist  Plan   Plan  Times per week: 3-5 while on acute floor  Current Treatment Recommendations: Transfer Training,ADL/Self-care Training,Patient/Caregiver Education & Training,Modalities,Gait Training,Positioning  Safety Devices  Type of devices:  All fall risk precautions in place,Call light within reach,Chair alarm in place,Left in chair,Nurse notified Edward Breen)    AM-PAC Score  AM-PAC Inpatient Mobility Raw Score : 14 (01/18/22 0840)  AM-PAC Inpatient T-Scale Score : 38.1 (01/18/22 0840)  Mobility Inpatient CMS 0-100% Score: 61.29 (01/18/22 0840)  Mobility Inpatient CMS G-Code Modifier : CL (01/18/22 0840)    Goals  Short term goals  Time Frame for Short term goals: 1-2 days  Short term goal 1: bed mobility at min/mod assist  Short term goal 2: transfers at cga/min assist  Short term goal 3: ambulation at cga/min for room distances using quad cane  Patient Goals   Patient goals : wants to go home       Therapy Time   Individual Concurrent Group Co-treatment   Time In 0745         Time Out 0840         Minutes 2050 Prosper Pinto, PT

## 2022-01-18 NOTE — PROGRESS NOTES
Pt awake in chair. Chair alarm on. Ice reapplied to L shoulder. Pain level assessed and medication given per MAR. Will reassess pain per protocol. Son at bedside, and will stay overnight. Call light within reach. Fall precautions in place. Will monitor per unit protocol.      Electronically signed by Crista Gottlieb RN on 1/18/2022 at 3:33 PM

## 2022-01-18 NOTE — PROGRESS NOTES
[facility] Orthopedic Surgery   Progress Note      S/P :  SUBJECTIVE  Up in chair. Alert and oriented to self, No recall of fall or yesterday. Daughter remained at bedside all night. . Pain is   described in left shoulder and with the intensity of moderate. Pain is described as aching. OBJECTIVE              Physical                      VITALS:  BP (!) 155/67   Pulse 61   Temp 97.4 °F (36.3 °C) (Oral)   Resp 19   Ht 5' 3\" (1.6 m)   Wt 152 lb 8.9 oz (69.2 kg)   SpO2 95%   BMI 27.02 kg/m²                     MUSCULOSKELETAL:Left wrist with intact flex/ext and hand grasp and extension and thumbs up   Left shoulder soft and swollen, no gross bruising or deformity noted.  Left arm in sling                NEUROLOGIC:                                  Sensory:  Touch:  Left Upper Extremity:  normal                                      Data       CBC:   Lab Results   Component Value Date    WBC 7.3 01/18/2022    RBC 3.45 01/18/2022    HGB 10.9 01/18/2022    HCT 32.6 01/18/2022    MCV 94.6 01/18/2022    MCH 31.7 01/18/2022    MCHC 33.5 01/18/2022    RDW 16.2 01/18/2022     01/18/2022    MPV 7.1 01/18/2022        WBC:    Lab Results   Component Value Date    WBC 7.3 01/18/2022        Hemoglobin/Hematocrit:    Lab Results   Component Value Date    HGB 10.9 01/18/2022    HCT 32.6 01/18/2022        PT/INR:  No results found for: PROTIME, INR           Current Inpatient Medications             Current Facility-Administered Medications: sodium chloride flush 0.9 % injection 5-40 mL, 5-40 mL, IntraVENous, 2 times per day  sodium chloride flush 0.9 % injection 5-40 mL, 5-40 mL, IntraVENous, PRN  0.9 % sodium chloride infusion, 25 mL, IntraVENous, PRN  enoxaparin (LOVENOX) injection 40 mg, 40 mg, SubCUTAneous, Daily  ondansetron (ZOFRAN-ODT) disintegrating tablet 4 mg, 4 mg, Oral, Q8H PRN **OR** ondansetron (ZOFRAN) injection 4 mg, 4 mg, IntraVENous, Q6H PRN  polyethylene glycol (GLYCOLAX) packet 17 g, 17 g, Oral, Daily PRN  acetaminophen (TYLENOL) tablet 650 mg, 650 mg, Oral, Q6H PRN **OR** acetaminophen (TYLENOL) suppository 650 mg, 650 mg, Rectal, Q6H PRN  HYDROcodone-acetaminophen (NORCO) 5-325 MG per tablet 1 tablet, 1 tablet, Oral, Q4H PRN  morphine (PF) injection 2 mg, 2 mg, IntraVENous, Q4H PRN  docusate sodium (COLACE) capsule 100 mg, 100 mg, Oral, Daily  acetaminophen (TYLENOL) tablet 650 mg, 650 mg, Oral, 3 times per day  HYDROcodone-acetaminophen (NORCO) 5-325 MG per tablet 1 tablet, 1 tablet, Oral, Q4H PRN  melatonin tablet 6 mg, 6 mg, Oral, Nightly PRN  albuterol sulfate  (90 Base) MCG/ACT inhaler 2 puff, 2 puff, Inhalation, 4x Daily PRN  budesonide-formoterol (SYMBICORT) 160-4.5 MCG/ACT inhaler 2 puff, 2 puff, Inhalation, BID  folic acid (FOLVITE) tablet 1,000 mcg, 1,000 mcg, Oral, Daily  hydroCHLOROthiazide (MICROZIDE) capsule 12.5 mg, 12.5 mg, Oral, Daily  methotrexate (RHEUMATREX) chemo tablet 17.5 mg, 17.5 mg, Oral, Weekly  vitamin D (CHOLECALCIFEROL) tablet 2,000 Units, 2,000 Units, Oral, Daily  calcium-cholecalciferol 500-200 MG-UNIT per tablet 1 tablet, 1 tablet, Oral, Daily  predniSONE (DELTASONE) tablet 2.5 mg, 2.5 mg, Oral, Every Other Day    ASSESSMENT AND PLAN    Fall at home  Left shoulder pain  Left proximal humerus fracture  Recommend nonoperative treatment for now  Plan NWB in sling left arm  No shoulder ROM for now  BID elbow, forearm, wrist, finger ROM exercises              Handout of exercises given  PT OT to see  SS for DC planning, home with home care and family attendance 24/7 per daughter request.   Memory loss, this may be baseline  Followup Dr Abe Woody in office in1-2 weeks.      José Daniels, MARSHA - CNP  1/18/2022  9:53 AM

## 2022-01-18 NOTE — PLAN OF CARE
Problem: Falls - Risk of:  Goal: Will remain free from falls  Description: Will remain free from falls  1/18/2022 0733 by Edgar Landin RN  Outcome: Ongoing   Fall precautions in place. Bed in lowest position. Gripper socks on. Call light in reach. Problem: Pain:  Goal: Control of acute pain  Description: Control of acute pain  1/18/2022 0733 by Edgar Landin RN  Outcome: Ongoing   Will assess pain level and administer medications per [facility]    Problem: Skin Integrity:  Goal: Absence of new skin breakdown  Description: Absence of new skin breakdown  1/18/2022 0733 by Edgar Landin RN  Outcome: Ongoing

## 2022-01-18 NOTE — PROGRESS NOTES
Hospitalist Progress Note      PCP: Ekaterina Sy    Date of Admission: 1/17/2022    Chief Complaint: [facility] Course: 80-year-old female admitted to the hospital status post fall  Found to have left humeral fracture admitted for care  Patient has prednisone dependent RA  Also treated for dehydration    Subjective: Doing okay. Good spirits. Family is present  Working with therapy    Medications:  Reviewed    Infusion Medications    sodium chloride       Scheduled Medications    sodium chloride flush  5-40 mL IntraVENous 2 times per day    enoxaparin  40 mg SubCUTAneous Daily    docusate sodium  100 mg Oral Daily    acetaminophen  650 mg Oral 3 times per day    budesonide-formoterol  2 puff Inhalation BID    folic acid  2,529 mcg Oral Daily    hydroCHLOROthiazide  12.5 mg Oral Daily    methotrexate  17.5 mg Oral Weekly    Vitamin D  2,000 Units Oral Daily    calcium-cholecalciferol  1 tablet Oral Daily    predniSONE  2.5 mg Oral Every Other Day     PRN Meds: sodium chloride flush, sodium chloride, ondansetron **OR** ondansetron, polyethylene glycol, acetaminophen **OR** acetaminophen, HYDROcodone 5 mg - acetaminophen, morphine, HYDROcodone 5 mg - acetaminophen, melatonin, albuterol sulfate HFA      Intake/Output Summary (Last 24 hours) at 1/18/2022 1635  Last data filed at 1/18/2022 1327  Gross per 24 hour   Intake 1090 ml   Output 300 ml   Net 790 ml       Physical Exam Performed:    /67   Pulse 80   Temp 98 °F (36.7 °C) (Oral)   Resp 16   Ht 5' 3\" (1.6 m)   Wt 152 lb 8.9 oz (69.2 kg)   SpO2 95%   BMI 27.02 kg/m²     General appearance: No apparent distress, appears stated age and cooperative. HEENT: Pupils equal, round, and reactive to light. Conjunctivae/corneas clear. Neck: Supple, with full range of motion. No jugular venous distention. Trachea midline. Respiratory:  Normal respiratory effort.  Clear to auscultation, bilaterally without Rales/Wheezes/Rhonchi. Cardiovascular: Regular rate and rhythm with normal S1/S2 without murmurs, rubs or gallops. Abdomen: Soft, non-tender, non-distended with normal bowel sounds. Musculoskeletal: No clubbing, cyanosis or edema bilaterally. Full range of motion without deformity. Skin: Skin color, texture, turgor normal.  No rashes or lesions. Neurologic:  Neurovascularly intact without any focal sensory/motor deficits. Cranial nerves: II-XII intact, grossly non-focal.  Psychiatric: Alert and oriented, thought content appropriate, normal insight  Capillary Refill: Brisk,3 seconds, normal   Peripheral Pulses: +2 palpable, equal bilaterally       Labs:   Recent Labs     01/17/22  1358 01/18/22  0531   WBC 8.7 7.3   HGB 11.8* 10.9*   HCT 36.2 32.6*    216     Recent Labs     01/17/22  1358 01/18/22  0531   * 133*   K 3.8 3.8   CL 95* 98*   CO2 26 24   BUN 24* 19   CREATININE 0.9 0.7   CALCIUM 9.3 8.8     No results for input(s): AST, ALT, BILIDIR, BILITOT, ALKPHOS in the last 72 hours. No results for input(s): INR in the last 72 hours. Recent Labs     01/17/22  1358   TROPONINI <0.01       Urinalysis:      Lab Results   Component Value Date    NITRU Negative 01/17/2022    BLOODU Negative 01/17/2022    SPECGRAV 1.019 01/17/2022    GLUCOSEU Negative 01/17/2022       Radiology:  XR SHOULDER LEFT (MIN 2 VIEWS)   Final Result   Acute left proximal humeral neck and head fracture. No dislocation. Limited   positioning of the left shoulder likely due to patient factors         XR HUMERUS LEFT (MIN 2 VIEWS)   Final Result   Left humeral neck fracture. No other acute humeral fracture identified. XR CHEST PORTABLE   Final Result   Moderate interstitial fibrotic changes in the lungs. No pneumothorax. No displaced rib fractures. CT HEAD WO CONTRAST   Final Result   No acute intracranial abnormality.          CT CERVICAL SPINE WO CONTRAST   Final Result   No acute abnormality of the cervical spine. Assessment/Plan:    Left humeral fracture-  Nonoperative  Continue sling of the left arm  Per Ortho no shoulder range of motion for now  Receiving elbow forearm wrist finger range of motion exercises with close follow-up with Ortho in 1 to 2 weeks  Consultation and recommendations greatly appreciated  Continue another day of therapy I am concerned about safety with discharge today. Nursing was in agreement along with family      Dehydration-resolved I have discontinued all IV fluids  Encourage oral fluids      Rheumatoid arthritis-stable resume home prednisone      Hypertension-stable continue hydrochlorothiazide    DVT Prophylaxis: Lovenox  Diet: ADULT DIET;  Regular  Code Status: Full Code    PT/OT Eval Status: Ordered and ongoing    Kenney Wise MD

## 2022-01-18 NOTE — CARE COORDINATION
INITIAL CASE MANAGEMENT ASSESSMENT    Reviewed chart, met with patient to assess possible discharge needs. Explained Case Management role/services. Living Situation: lives at home in a split level house. There are 3+1+1+3 LILIAM- can manage on one level   Her daughter Johanna Flores lives with her but does work so is not home all the time    ADLs: independent     DME: RW,cane- but states she does not use  Per PT/OT recs- pt will need BSC and small base quad cane- daughter BODØ states she will order a [facility] and is agreeable to referral to [facility] for cane -- Gail Joshi w/ [facility] [employer]    PT/OT Recs:    Discharge Recommendations:  2-3 sessions per week,24 hour supervision or assist,Home with [address] [place] [address] would benefit from continued therapy after discharge,3-5 sessions per week  OT Equipment Recommendations  Equipment Needed: Yes (family to purchase [facility])  Mobility Devices: Canes  Cane: [employer]    Active Services: none     Transportation: daughter can transport at [place]     Medications: no issues    PCP: Dr Colton Delacruz      HD/PD: n/a    PLAN/COMMENTS: patient plans to return home at [place]- she will either return to her own home with her family taking turns staying with her OR she may go stay with her daughter DOLORES-- will need to verify address for home care. Patient agreeable to home care and she prefers [facility] --referral made to Shelby mejia/ [facility] [address] for Transition of Care is related to the following treatment goals: home with home care     The Patient and/or patient representative  was provided with a choice of provider and agrees   with the discharge plan. [x] Yes [] No    Freedom of choice list was provided with basic dialogue that supports the patient's individualized plan of care/goals, treatment preferences and shares the quality data associated with the providers. [x] Yes [] No    SW/CM provided contact information for patient or family to call with any questions.   SW/CM will follow and assist as needed.   Electronically signed by Chana Morin on 1/18/2022 at 12:47 PM  #[phone]    1/18 received call from daughter David Mari is for patient to come and stay at her home   Address is :  [address] [place] [address]  Daughter Law Ordaz is contact # [phone]    Family is buying a BSC- will need cane from [employer]   Electronically signed by Chana Morin on 1/18/2022 at 4:34 PM

## 2022-01-18 NOTE — PROGRESS NOTES
Patient is resting in bed. Alert and oriented X3. IV in place and infusing. LUE in a sling, NWB. Patient complaining of pain. PRN pain medication given per order (see MAR). Assessment complete. All patient needs are met at this time. Fall precautions are in place. Call light is in reach. Will continue to monitor.    Electronically signed by Marianela Gustafson RN on 1/18/2022 at 1:14 AM

## 2022-01-18 NOTE — PROGRESS NOTES
Occupational Therapy   Occupational Therapy Initial Assessment  Date: 2022   Patient Name: Honorio Sanders  MRN: [id number]     : 3/2/1933    Date of Service: 2022    Discharge Recommendations:  2-3 sessions per week,24 hour supervision or assist,Home with [address] [place] [address] would benefit from continued therapy after discharge,3-5 sessions per week  OT Equipment Recommendations  Equipment Needed: Yes (family to purchase [facility])  Mobility Devices: Canes  Cane: [employer] scored a 15/24 on the AM-PAC ADL Inpatient form. Current research shows that an AM-PAC score of 18 or greater is typically associated with a discharge to the patient's home setting. Based on the patient's AM-PAC score, and their current ADL deficits, it is recommended that the patient have 2-3 sessions per week of Occupational Therapy at d/c to increase the patient's independence. At this time, this patient demonstrates the endurance and safety to discharge home with [address] [place] [address] (home vs OP services) and a follow up treatment frequency of 2-3x/wk. Please see assessment section for further patient specific details. If patient discharges prior to next session this note will serve as a discharge summary. Please see below for the latest assessment towards goals. Assessment   Performance deficits / Impairments: Decreased functional mobility ; Decreased safe awareness;Decreased balance;Decreased ADL status; Decreased ROM; Decreased endurance;Decreased high-level IADLs;Decreased strength  Assessment: 81 yo female admitted  for a fall resulting in L proximal humerus fx. Now NWB with sling and no shoulder ROM. PMH: Spinal stenosis, RA. PTA, pt lives with dtr with assist for IADLs, otherwise, pt independent with ADL and fxl mobility (cane PRN and furniture surfing). Today, pt functioning well below baseline most limited by L shoulder pain and decreased balance.  Pt required Mod A bed mobility, and Min A for fxl tx and mobility with quad cane household distances with unsteadiness and fatigue. Pt completes toileting Marianela, LB dressing Max A, and seated grooming SBA. Anticipate Mod/Max A LB and Min A UB ADL based on balance, endurance, cognition, pain and PLOF. Pt and dtr educated on seated ADLs, need for [facility] for safe toilet tx, sponge bathing, UB dressing, sling alignment, sleep positioning, need for 24 hr assist, and importance of LUE exercises (not shoulder) (too painful/fatigued to attempt at this time). Cont acute OT to address above deficits. Pt and family hoping for return home between 5 siblings and hired help- pt could safely return home with 24 hr hands on assist, Bed side commode, quad cane, and OT 2-3x/week front load to facilitate safe d/c home. If 24 hr hands on assist unavailable, rec OT 3-5x/week  Treatment Diagnosis: impaired ADL/fxl mobility  Prognosis: Fair  Decision Making: Medium Complexity  OT Education: OT Role;Transfer Training;Plan of Care;Precautions; ADL Adaptive Strategies  Patient Education: Pt and dtr educated on seated ADLs, need for [facility] for safe toilet tx, sponge bathing, UB dressing, sling alignment, sleep positioning, need for 24 hr assist, and importance of LUE exercises (not shoulder) (too painful/fatigued to attempt at this time)  REQUIRES OT FOLLOW UP: Yes  Activity Tolerance  Activity Tolerance: Patient limited by pain; Patient limited by fatigue  Safety Devices  Safety Devices in place: Yes  Type of devices: Nurse notified;Gait belt;Call light within reach; Chair alarm in place; Left in chair;Patient at risk for falls         Patient Diagnosis(es): The primary encounter diagnosis was Fall, initial encounter. Diagnoses of Fracture of neck of left humerus, closed, initial encounter, Memory problem, and Gait instability were also pertinent to this visit. has a past medical history of Hypertension, Pulmonary fibrosis ([address] Rheumatoid arteritis ([facility] [address] and Vitamin D deficiency.    has no past surgical history on file. Treatment Diagnosis: impaired ADL/fxl mobility      Restrictions  Restrictions/Precautions  Restrictions/Precautions: Fall Risk  Position Activity Restriction  Other position/activity restrictions: NWB left UE   -sling left UE. No shoulder ROM    Subjective   General  Chart Reviewed: Yes  Patient assessed for rehabilitation services?: Yes  Additional Pertinent Hx: 81 yo female admitted 1/17 for a fall resulting in L proximal humerus fx. Now NWB with sling and no shoulder ROM. PMH: Spinal stenosis, RA  Diagnosis: L proximal humerus fx  Subjective  Subjective: Pt resting in bed upon arrival and agreeable to OT/PT eval. Pt reporting 1-2/10 pain in L shoulder  General Comment  Comments: RN ok to see  Vital Signs  Temp: 97.4 °F (36.3 °C)  Temp Source: Oral  Pulse: 61  Heart Rate Source: Monitor  Resp: 19  BP: (!) 155/67  BP Location: Right Arm  MAP (mmHg): 97  Level of Consciousness: Alert (0)  MEWS Score: 1  Oxygen Therapy  SpO2: 95 %  Pulse Oximeter Device Mode: Intermittent  O2 Device: None (Room air)    Social/Functional History  Social/Functional History  Lives With: Daughter  Type of Home: House  Home Layout: Multi-level,Able to Live on Main level with bedroom/bathroom (bed and bath on main level. kitchen and living space up 3 steps)  Home Access: Stairs to enter without rails  Entrance Stairs - Number of Steps: 3+1+1+3 or full flight from basement to main level with one rail (R)  Entrance Stairs - Rails: None  Bathroom Shower/Tub: Tub/Shower unit,Walk-in shower  Bathroom Toilet: Standard  Home Equipment: Cane,Rolling walker  ADL Assistance: Independent  Homemaking Assistance: Needs assistance (dtr does most. pt empty dishes)  Ambulation Assistance: Independent (cane PRN (RW if back is bothering her). Holds onto family in community)  Transfer Assistance: Independent  Additional Comments: fall brought here.  No other fall. sleeps in regular bed       Objective   Vision: Impaired  Vision Exceptions: Wears glasses at all times  Hearing: Within functional limits    Orientation  Overall Orientation Status: Impaired  Orientation Level: Oriented to person;Oriented to situation;Oriented to place; Disoriented to time  Observation/Palpation  Observation: LUE in sling- adjusted  Balance  Sitting Balance: Stand by assistance  Standing Balance: Contact guard assistance (for pant management)  Functional Mobility  Functional - Mobility Device:  (quad cane)  Activity:  (EOB>bathroom>doors of bathroom into stedy then to recliner)  Assist Level: Minimal assistance  Functional Mobility Comments: min-moderate unsteadiness no LOB. Forward flexed posture. Toilet Transfers  Toilet - Technique: Ambulating (quad cane)  Toilet Transfer: Minimal assistance  ADL  Grooming: Stand by assistance (seated hand washing)  LE Dressing: Maximum assistance (assist with threading and partial pants over hips)  Toileting: Minimal assistance (assist pants over hips)  Additional Comments: Anticipate Mod/Max A LB and Min A UB ADL based on balance, endurance, cognition, pain and PLOF  Tone RUE  RUE Tone: Normotonic  Tone LUE  LUE Tone: Normotonic  Coordination  Movements Are Fluid And Coordinated: Yes     Bed mobility  Supine to Sit: Moderate assistance (use of bed rail)  Sit to Supine: Unable to assess  Scooting: Moderate assistance  Transfers  Sit to stand: Minimal assistance  Stand to sit: Minimal assistance  Transfer Comments: Quad cane. cues     Cognition  Overall Cognitive Status: Exceptions  Following Commands:  Follows one step commands consistently  Attention Span: Attends with cues to redirect  Safety Judgement: Decreased awareness of need for safety  Problem Solving: Decreased awareness of errors  Insights: Decreased awareness of deficits  Initiation: Requires cues for some  Sequencing: Requires cues for some        Sensation  Overall Sensation Status: WFL        RUE AROM (degrees)  RUE AROM : WFL  LUE Strength  LUE Strength Comment: RICARDA- sling/fx  RUE Strength  Gross RUE Strength: WFL                Plan   Plan  Times per week: 3-5  Current Treatment Recommendations: Strengthening,Endurance Training,Patient/Caregiver Education & Training,ROM,Self-Care / ADL,Balance Training,Pain Management,Functional Mobility Training,Safety Education & Training,Positioning    AM-PAC Score        AM-PAC Inpatient Daily Activity Raw Score: 15 (01/18/22 0903)  AM-PAC Inpatient ADL T-Scale Score : 34.69 (01/18/22 0903)  ADL Inpatient CMS 0-100% Score: 56.46 (01/18/22 0903)  ADL Inpatient CMS G-Code Modifier : CK (01/18/22 0903)    Goals  Short term goals  Time Frame for Short term goals: prior to d/c  Short term goal 1: toileting CGA  Short term goal 2: complete LUE AROM (wrist, forearm, elbow) out of sling (not shoulder) to decreased swelling and stiffness in joints for ADLs  Short term goal 3: LB dressing Mod A  Short term goal 4: UB dressing and sling management with assist from family with Mod A  Short term goal 5: Tolreate 3 min fxl standing task CGA  Patient Goals   Patient goals : return home       Therapy Time   Individual Concurrent Group Co-treatment   Time In 0745         Time Out 0845         Minutes 60         Timed Code Treatment Minutes: 45 Minutes (15 eval. 30 ADL.  15 TA)       HARPREET Palomino, OTR/L

## 2022-01-18 NOTE — PROGRESS NOTES
4 Eyes Skin Assessment     NAME:  Karen Norris  YOB: 1933  MEDICAL RECORD NUMBER:  [id number]    The patient is being assess for  Admission    I agree that 2 RN's have performed a thorough Head to Toe Skin Assessment on the patient. ALL assessment sites listed below have been assessed. Areas assessed by both nurses:    Head, Face, Ears, Shoulders, Back, Chest, Arms, Elbows, Hands, Sacrum. Buttock, Coccyx, Ischium and Legs. Feet and Heels        Does the Patient have a Wound?  No noted wound(s)       Jori Prevention initiated:  yes  Wound Care Orders initiated:  No    Pressure Injury (Stage 3,4, Unstageable, DTI, NWPT, and Complex wounds) if present place consult order under [de-identified] No    New and Established Ostomies if present place consult order under : No      Nurse 1 eSignature: Electronically signed by Yarelis Pascual RN on 1/18/22 at 1:12 AM EST    **SHARE this note so that the co-signing nurse is able to place an eSignature**    Nurse 2 eSignature: Electronically signed by Jami Mensah RN on 1/18/22 at 4:52 AM EST

## 2022-01-18 NOTE — ED PROVIDER NOTES
[address]      Pt Name: Karen Norris  MRN: [id number]  [place] 3/2/1933  Date of evaluation: 1/17/2022  Provider: Brenda Farah MD    CHIEF COMPLAINT       Chief Complaint   Patient presents with    Fall     pt to ED via squad from home with daughter, pt states she didnt  her foot enough and fell. c/o left arm pain. pt walks with a walker. pt was given 50 mcg fentanyl intranasally and 50 mcg IM. L arm pain, fall    HISTORY OF PRESENT ILLNESS   (Location/Symptom, Timing/Onset,Context/Setting, Quality, Duration, Modifying Factors, Severity)  Note limiting factors. Karen Norris is a 80 y.o. female who presents to the emergency department c/o L arm pain s/p fall. Pt lives at home and fell, unsure mechanism of fall as pt with longstanding h/o memory problems without formal dx of dementia. Pt has no recollection of the fall. C/o pain to the L proximal arm, nonradiating, worse when touching affected area and associated with decreased ROM. Associated with neck pain. Denies focal weakness, sensory loss, headache, nausea, vomiting, chest pain. History otherwise limited 2/2 pt's mental status. Symptoms not otherwise alleviated or exacerbated by other factors. NursingNotes were reviewed. REVIEW OF SYSTEMS    (2-9 systems for level 4, 10 or more for level 5)       Constitutional: No fever or chills. Eye: No visual disturbances. No eye pain. Ear/Nose/Mouth/Throat: No nasal congestion. No sore throat. Respiratory: No cough, No shortness of breath, No sputum production. Cardiovascular: No chest pain. No palpitations. Gastrointestinal: No abdominal pain. No nausea or vomiting  Genitourinary: No dysuria. No hematuria. Hematology/Lymphatics: No bleeding or bruising tendency. Immunologic: No malaise. No swollen glands. Musculoskeletal: No back pain. + joint pain. Integumentary: No rash. No abrasions. Neurologic: No headache.  No focal numbness or weakness. PAST MEDICAL HISTORY     Past Medical History:   Diagnosis Date    Hypertension     [facility]monary fibrosis ([facility]     Rheumatoid arteritis ([facility]     Vitamin D deficiency          SURGICALHISTORY     History reviewed. No pertinent surgical history. CURRENT MEDICATIONS       Current Discharge Medication List      CONTINUE these medications which have NOT CHANGED    Details   fluticasone-salmeterol (ADVAIR) 250-50 MCG/DOSE AEPB Inhale 1 puff into the lungs 2 times daily      methotrexate (RHEUMATREX) 2.5 MG chemo tablet Take 17.5 mg by mouth once a week Take 7 tablets by mouth once a week. predniSONE (DELTASONE) 2.5 MG tablet Take 2.5 mg by mouth every other day      hydroCHLOROthiazide (MICROZIDE) 12.5 MG capsule Take 12.5 mg by mouth daily      folic acid (FOLVITE) 1 MG tablet Take 1,000 mcg by mouth daily      albuterol sulfate (PROAIR RESPICLICK) 877 (90 Base) MCG/ACT aerosol powder inhalation Inhale 2 puffs into the lungs 4 times daily as needed      acetaminophen (ACETAMINOPHEN 8 HOUR) 650 MG extended release tablet Take 650 mg by mouth daily      vitamin D (CHOLECALCIFEROL) 25 MCG (1000 UT) TABS tablet Take 2,000 Units by mouth daily      Calcium Carb-Cholecalciferol 600-500 MG-UNIT CAPS Take by mouth             ALLERGIES     Hydrogen peroxide, Lisinopril, and Neomycin-bacitracin zn-polymyx    FAMILY HISTORY     History reviewed. No pertinent family history. SOCIAL HISTORY       Social History     Socioeconomic History    Marital status:       Spouse name: None    Number of children: None    Years of education: None    Highest education level: None   Occupational History    None   Tobacco Use    Smoking status: Never Smoker    Smokeless tobacco: Never Used   Substance and Sexual Activity    Alcohol use: Yes     Comment: occ    Drug use: Not Currently    Sexual activity: None   Other Topics Concern    None   Social History Narrative    None     Social Determinants of Health     Financial Resource Strain:     Difficulty of Paying Living Expenses: Not on file   Food Insecurity:     Worried About Running Out of Food in the Last Year: Not on file    Farheen of Food in the Last Year: Not on file   Transportation Needs:     Lack of Transportation (Medical): Not on file    Lack of Transportation (Non-Medical): Not on file   Physical Activity:     Days of Exercise per Week: Not on file    Minutes of Exercise per Session: Not on file   Stress:     Feeling of Stress : Not on file   Social Connections:     Frequency of Communication with Friends and Family: Not on file    Frequency of Social Gatherings with Friends and Family: Not on file    Attends Confucianist Services: Not on file    Active Member of [address] [place] [address] [employer] or Organizations: Not on file    Attends Club or Organization Meetings: Not on file    Marital Status: Not on file   Intimate Partner Violence:     Fear of Current or Ex-Partner: Not on file    Emotionally Abused: Not on file    Physically Abused: Not on file    Sexually Abused: Not on file   Housing Stability:     Unable to Pay for Housing in the Last Year: Not on file    Number of [place] in the Last Year: Not on file    Unstable Housing in the Last Year: Not on file       SCREENINGS             PHYSICAL EXAM    (up to 7 for level 4, 8 or more for level 5)     ED Triage Vitals [01/17/22 1145]   BP Temp Temp Source Pulse Resp SpO2 Height Weight   (!) 163/62 98.2 °F (36.8 °C) Oral 82 16 98 % 5' 3\" (1.6 m) 178 lb 12.7 oz (81.1 kg)       General: Alert and oriented to self and situation, in distress acute distress. Eye: Normal conjunctiva. Pupils equal and reactive. HENT: Oral mucosa is moist.  Normocephalic, atraumatic. Neck: supple, no rigidity, ttp in the midline posteriorly. Respiratory: Respirations even and non-labored. CTAB. Cardiovascular: Normal rate, Regular rhythm. Intact peripheral pulses.   Gastrointestinal: Soft, Non-tender, Non-distended. Physical Exam  Musculoskeletal:      Left shoulder: Tenderness and bony tenderness present. Decreased range of motion. Normal pulse. Left upper arm: Swelling, deformity, tenderness and bony tenderness present. Left elbow: Normal. Normal range of motion. No tenderness. Left forearm: Normal. No tenderness or bony tenderness. Left wrist: No deformity, bony tenderness or snuff box tenderness. Normal range of motion. Normal pulse. Cervical back: No rigidity. Spinous process tenderness and muscular tenderness present. Normal range of motion. Integumentary: Warm, Dry. Neurologic: Alert and oriented to self and situation, not to place or time. No focal deficits. Psychiatric: Cooperative. DIAGNOSTIC RESULTS     EKG: All EKG's are interpreted by the Emergency Department Physician who either signs or Co-signsthis chart in the absence of a cardiologist.    The Ekg interpreted by me shows  normal sinus rhythm with a rate of 65 bpm  Axis is   Left axis deviation  QTc is  prolonged. Intervals and Durations are remarkable for LBBB and prolonged ME interval.      ST Segments: diffuse repolarization abns, LBBB  No prior EKG for comparison. RADIOLOGY:   Non-plain filmimages such as CT, Ultrasound and MRI are read by the radiologist. Plain radiographic images are visualized and preliminarily interpreted by the emergency physician with the below findings:      Interpretation per the Radiologist below, if available at the time ofthis note:    XR SHOULDER LEFT (MIN 2 VIEWS)   Final Result   Acute left proximal humeral neck and head fracture. No dislocation. Limited   positioning of the left shoulder likely due to patient factors         XR HUMERUS LEFT (MIN 2 VIEWS)   Final Result   Left humeral neck fracture. No other acute humeral fracture identified. XR CHEST PORTABLE   Final Result   Moderate interstitial fibrotic changes in the lungs. No pneumothorax.   No displaced rib fractures. CT HEAD WO CONTRAST   Final Result   No acute intracranial abnormality. CT CERVICAL SPINE WO CONTRAST   Final Result   No acute abnormality of the cervical spine. ED BEDSIDE ULTRASOUND:   Performed by ED Physician - none    LABS:  Labs Reviewed   CBC WITH AUTO DIFFERENTIAL - Abnormal; Notable for the following components:       Result Value    RBC 3.82 (*)     Hemoglobin 11.8 (*)     RDW 16.3 (*)     Lymphocytes Absolute 0.6 (*)     All other components within normal limits    Narrative:     Performed at:  [facility]  [address], [place] [facility] [employer] [facility] [address]   Phone [phone]   BASIC METABOLIC PANEL W/ REFLEX TO MG FOR LOW K - Abnormal; Notable for the following components:    Sodium 134 (*)     Chloride 95 (*)     Glucose 115 (*)     BUN 24 (*)     GFR Non- 59 (*)     All other components within normal limits    Narrative:     Performed at:  [place] [address], [place] [facility] [employer] [facility] [address]   Phone [phone]   TROPONIN    Narrative:     Performed at:  [address] [facility] [employer] [facility] [address]   Phone [phone]   URINE RT REFLEX TO CULTURE    Narrative:     Performed at:  [place] [address], [place] [facility] [facility] [address]   Phone [phone]   BASIC METABOLIC PANEL W/ REFLEX TO MG FOR LOW K   CBC   RETICULOCYTES   FERRITIN   BLOOD OCCULT STOOL DIAGNOSTIC       All other labs were within normal range or not returned as of this dictation.     EMERGENCY DEPARTMENT COURSE and DIFFERENTIAL DIAGNOSIS/MDM:   Vitals:    Vitals:    01/17/22 1645 01/17/22 1730 01/17/22 1909 01/17/22 1956   BP: 134/61 139/86 (!) 166/83    Pulse:  79 60    Resp:  16 16 16   Temp:  97.4 °F (36.3 °C) 97.6 °F (36.4 °C)    TempSrc:  Oral Oral    SpO2: 99% 97% 96% 95%   Weight:       Height: Medical decision making:  Kat Acosta in [address] is an 79 yo F with PMHx of memory issues now for several months, fell today, has no recollection of the fall, p/w L shoulder pain, found to have L humeral fx, closed injury, neurovascularly intact, CTH and CT C-spine negative in addition to rest of traumatic workup, given unsteadiness and new injury, requires PT/OT evaluation, pain control, consulted ortho as well, who will see pt as inpt, they agree with sling for mgmt of acute L humeral fx. Admitted to hospitalist.    Medications   acetaminophen (TYLENOL) tablet 650 mg (650 mg Oral Given 1/17/22 2010)   HYDROcodone-acetaminophen (NORCO) 5-325 MG per tablet 1 tablet (1 tablet Oral Given 1/17/22 2142)   HYDROcodone-acetaminophen (NORCO) 5-325 MG per tablet 1 tablet (1 tablet Oral Given 1/17/22 1335)       CONSULTS:  [address] [place] TO CASE MANAGEMENT  IP CONSULT TO ORTHOPEDIC SURGERY    PROCEDURES:  Unless otherwise noted below, none         FINAL IMPRESSION      1. Fall, initial encounter    2. Fracture of neck of left humerus, closed, initial encounter    3. Memory problem    4.  Gait instability          DISPOSITION/PLAN   DISPOSITION Admitted 01/17/2022 04:30:02 PM        (Please note that portions of this note were completed with a voice recognition program.Efforts were made to edit the dictations but occasionally words are mis-transcribed.)    Eva Yu MD (electronically signed)  Attending Emergency Physician          Eva Yu MD  01/18/22 9660

## 2022-01-18 NOTE — DISCHARGE INSTR - COC
[facility] ([facility]) Continuity of Care Form    Patient Name:  Alex Wood  : 3/2/1933    MRN:  [id number]    Admit date:  2022  Discharge date:  22    Code Status Order: Full Code  Advance Directives: {YES OR NO:}    Admitting Physician: Ed Mckeon MD  PCP: Araseli Aguilar    Discharging Nurse: [facility] Unit/Room#: S8K-0608/3075-82  Discharging Unit Phone Number: [phone]    Emergency Contact:        Past Surgical History:  History reviewed. No pertinent surgical history. Immunization History:   Immunization History   Administered Date(s) Administered    COVID-19, LOUANN Triana, 30mcg/0.3mL 2021, 2021, 2021       Active Problems:  Principal Problem:    Closed fracture of proximal end of left humerus, initial encounter  Resolved Problems:    * No resolved hospital problems. *      Isolation/Infection:       Nurse Assessment:  Last Vital Signs:BP (!) 155/67   Pulse 61   Temp 97.4 °F (36.3 °C) (Oral)   Resp 19   Ht 5' 3\" (1.6 m)   Wt 152 lb 8.9 oz (69.2 kg)   SpO2 95%   BMI 27.02 kg/m²   Last documented pain score (0-10 scale): Pain Level: 0  Last Weight:   Wt Readings from Last 1 Encounters:   22 152 lb 8.9 oz (69.2 kg)     Mental Status:  oriented, alert, coherent, logical, thought processes intact, and able to concentrate and follow conversation     IV Access:  - None    Nursing Mobility/ADLs:  Walking   Assisted  Transfer  Assisted  Bathing  Assisted  Dressing  Assisted  Toileting  Assisted  Feeding  [facility] Delivery   whole    Wound Care Documentation and Therapy:     Elimination:  Urinary Catheter: None   Colostomy/Ileostomy: No  Continence:    Bowel: No  Bladder: No  Date of Last BM: 22    Intake/Output Summary (Last 24 hours)     Intake/Output Summary (Last 24 hours) at 2022 1000  Last data filed at 2022 0909  Gross per 24 hour   Intake 730 ml   Output 300 ml   Net 430 ml     Safety Concerns:     History of Falls (last 30 days) and At Risk for Falls    Impairments/Disabilities:      Vision and Hearing    Nutrition Therapy:  Current Nutrition Therapy: ADULT DIET; Regular  Routes of Feeding: Oral  Liquids: No Restrictions  Daily Fluid Restriction: no  Last Modified Barium Swallow with Video (Video Swallowing Test): not done    Treatments at the Time of Hospital Discharge:   Respiratory Treatments: n/a  Oxygen Therapy:  is not on home oxygen therapy. Ventilator:    - No ventilator support    Lab orders for discharge:        Rehab Therapies: Physical Therapy, Occupational Therapy and nursing care  Weight Bearing Status/Restrictions: strict nonweightbearing left arm. No reaching left arm in any direction, keep elbow at side all times. REmove sling BID for gentle ROM left hand wrist and elbow only to prevent stiffness. Other Medical Equipment (for information only, NOT a DME order): quad cane   Other Treatments: ***    Patient's personal belongings (please select all that are sent with patient):  Glasses, Dentures upper and lower    RN SIGNATURE:  Electronically signed by Gloria Grace RN on 22 at 9:53 AM EST    PHYSICIAN SECTION    Prognosis: Good    Condition at Discharge: Stable    Rehab Potential (if transferring to Rehab): Good    Physician Certification: I certify the above orders, information, and transfer of Rell Turner is necessary for the continuing treatment of the diagnosis listed and that she requires [address] for less 30 days.      Update Admission H&P: see dc summary    PHYSICIAN SIGNATURE:  Electronically signed by MARSHA Hernández CNP on 22 at 10:02 AM WING/ Dr Timmy Keith Status Date: 22    [facility] Readmission Risk Assessment Score:    Discharging to Facility/ Agency   Name: Priscilla Sanders  Address:  TOE:[phone]  [phone]    Dialysis Facility (if applicable) Name:  Address:  Dialysis Schedule:  Phone:  Fax:    / signature: Electronically signed by Haider Tran on 1/19/2022 at 9:39 AM           Followup Dr Vivek Garcia in 1-2 weeks   [phone]  [facility] and [facility] Medicine, [address] [place] [address],   [phone]

## 2022-01-18 NOTE — PLAN OF CARE
Problem: Falls - Risk of:  Goal: Will remain free from falls  Description: Will remain free from falls  Outcome: Ongoing  Note: Fall risk assessment completed. Fall precautions in place. Call light within reach. Pt educated on calling for assistance before getting up. Walkway free of clutter. Will continue to monitor. Goal: Absence of physical injury  Description: Absence of physical injury  Outcome: Ongoing  Note: Pt is free of injury. No injury noted. Fall precautions in place. Call light within reach. Will monitor. Problem: Pain:  Goal: Pain level will decrease  Description: Pain level will decrease  Outcome: Ongoing  Goal: Control of acute pain  Description: Control of acute pain  Outcome: Ongoing  Note: Pt assessed for pain. Pt in pain and assessed with 0-10 pain rating scale. Pt given prescribed analgesic for pain. (See eMar) Pt satisfied with pain relief thus far. Will reassess and continue to monitor. Goal: Control of chronic pain  Description: Control of chronic pain  Outcome: Ongoing     Problem: Skin Integrity:  Goal: Will show no infection signs and symptoms  Description: Will show no infection signs and symptoms  Outcome: Ongoing  Note: Pt is free of signs and symptoms of infection. Incision and dressing are clean, dry and intact. Vital signs stable. Will monitor.      Goal: Absence of new skin breakdown  Description: Absence of new skin breakdown  Outcome: Ongoing  Note: Patient will be absent of new skin breakdown     Electronically signed by Rosa Alex RN on 1/18/2022 at 1:11 AM

## 2022-01-19 VITALS
SYSTOLIC BLOOD PRESSURE: 148 MMHG | WEIGHT: 155.87 LBS | BODY MASS INDEX: 27.62 KG/M2 | DIASTOLIC BLOOD PRESSURE: 74 MMHG | RESPIRATION RATE: 17 BRPM | OXYGEN SATURATION: 91 % | HEIGHT: 63 IN | TEMPERATURE: 97.7 F | HEART RATE: 79 BPM

## 2022-01-19 PROCEDURE — 97110 THERAPEUTIC EXERCISES: CPT

## 2022-01-19 PROCEDURE — 97535 SELF CARE MNGMENT TRAINING: CPT

## 2022-01-19 PROCEDURE — 97530 THERAPEUTIC ACTIVITIES: CPT

## 2022-01-19 PROCEDURE — 2580000003 HC RX 258: Performed by: PEDIATRICS

## 2022-01-19 PROCEDURE — 6360000002 HC RX W HCPCS: Performed by: PEDIATRICS

## 2022-01-19 PROCEDURE — 6370000000 HC RX 637 (ALT 250 FOR IP): Performed by: PEDIATRICS

## 2022-01-19 RX ORDER — DOCUSATE SODIUM 100 MG/1
100 CAPSULE, LIQUID FILLED ORAL 2 TIMES DAILY
Qty: 20 CAPSULE | Refills: 0 | Status: SHIPPED | OUTPATIENT
Start: 2022-01-19 | End: 2022-10-09

## 2022-01-19 RX ADMIN — Medication 1 TABLET: at 08:27

## 2022-01-19 RX ADMIN — PREDNISONE 2.5 MG: 5 TABLET ORAL at 08:26

## 2022-01-19 RX ADMIN — SODIUM CHLORIDE, PRESERVATIVE FREE 10 ML: 5 INJECTION INTRAVENOUS at 08:32

## 2022-01-19 RX ADMIN — DOCUSATE SODIUM 100 MG: 100 CAPSULE ORAL at 08:27

## 2022-01-19 RX ADMIN — HYDROCODONE BITARTRATE AND ACETAMINOPHEN 1 TABLET: 5; 325 TABLET ORAL at 13:34

## 2022-01-19 RX ADMIN — FOLIC ACID 1000 MCG: 1 TABLET ORAL at 08:27

## 2022-01-19 RX ADMIN — ACETAMINOPHEN 650 MG: 325 TABLET ORAL at 08:26

## 2022-01-19 RX ADMIN — HYDROCHLOROTHIAZIDE 12.5 MG: 12.5 CAPSULE ORAL at 08:27

## 2022-01-19 RX ADMIN — Medication 2000 UNITS: at 08:26

## 2022-01-19 RX ADMIN — ENOXAPARIN SODIUM 40 MG: 100 INJECTION SUBCUTANEOUS at 08:27

## 2022-01-19 ASSESSMENT — PAIN SCALES - GENERAL
PAINLEVEL_OUTOF10: 3
PAINLEVEL_OUTOF10: 0
PAINLEVEL_OUTOF10: 7
PAINLEVEL_OUTOF10: 5

## 2022-01-19 ASSESSMENT — PAIN DESCRIPTION - DESCRIPTORS
DESCRIPTORS: ACHING
DESCRIPTORS: ACHING

## 2022-01-19 ASSESSMENT — PAIN DESCRIPTION - ONSET
ONSET: ON-GOING
ONSET: ON-GOING

## 2022-01-19 ASSESSMENT — PAIN DESCRIPTION - PROGRESSION
CLINICAL_PROGRESSION: NOT CHANGED
CLINICAL_PROGRESSION: GRADUALLY IMPROVING
CLINICAL_PROGRESSION: NOT CHANGED

## 2022-01-19 ASSESSMENT — PAIN DESCRIPTION - FREQUENCY
FREQUENCY: CONTINUOUS
FREQUENCY: CONTINUOUS

## 2022-01-19 ASSESSMENT — PAIN DESCRIPTION - PAIN TYPE
TYPE: ACUTE PAIN
TYPE: ACUTE PAIN

## 2022-01-19 ASSESSMENT — PAIN SCALES - WONG BAKER
WONGBAKER_NUMERICALRESPONSE: 0

## 2022-01-19 ASSESSMENT — PAIN DESCRIPTION - ORIENTATION
ORIENTATION: LEFT
ORIENTATION: LEFT

## 2022-01-19 ASSESSMENT — PAIN DESCRIPTION - LOCATION
LOCATION: SHOULDER
LOCATION: SHOULDER

## 2022-01-19 NOTE — PROGRESS NOTES
Patient alert and oriented, discharged to home with documented belongings. IV removed with no complications. Transported out of [facility] by wheelchair. Reviewed discharge, follow up, and medication instructions with patient and family member, patient and family member verbalized understanding. Written prescription handed to patient.  Electronically signed by Debbie Cordoba RN on 1/19/2022 at 1:54 PM

## 2022-01-19 NOTE — PLAN OF CARE
Problem: Falls - Risk of:  Goal: Will remain free from falls  Description: Will remain free from falls  1/18/2022 2108 by Tyrone Clayton RN  Outcome: Ongoing  Note: Patient will remain free from falls  1/18/2022 0733 by Fariba Freitas RN  Outcome: Ongoing  Goal: Absence of physical injury  Description: Absence of physical injury  1/18/2022 2108 by Tyrone Clayton RN  Outcome: Ongoing  Note: Patient will be absent of physical injury  1/18/2022 0733 by Fariba Freitas RN  Outcome: Ongoing     Problem: Pain:  Goal: Pain level will decrease  Description: Pain level will decrease  1/18/2022 2108 by Tyrone Clayton RN  Outcome: Ongoing  Note: Patients pain level will decrease  1/18/2022 0733 by Fariba Freitas RN  Outcome: Ongoing  Goal: Control of acute pain  Description: Control of acute pain  1/18/2022 2108 by Tyrone Clayton RN  Outcome: Ongoing  Note: Patient will have control of acute pain  1/18/2022 0733 by Fariba Freitas RN  Outcome: Ongoing  Goal: Control of chronic pain  Description: Control of chronic pain  1/18/2022 2108 by Tyrone Clayton RN  Outcome: Ongoing  Note: Patient will have control of chronic pain  1/18/2022 0733 by Fariba Freitas RN  Outcome: Ongoing     Problem: Skin Integrity:  Goal: Will show no infection signs and symptoms  Description: Will show no infection signs and symptoms  1/18/2022 2108 by Tyrone Clayton RN  Outcome: Ongoing  Note: Patient will show no signs and symptoms of infection  1/18/2022 0733 by Fariba Freitas RN  Outcome: Ongoing  Goal: Absence of new skin breakdown  Description: Absence of new skin breakdown  1/18/2022 2108 by Tyrone Clayton RN  Outcome: Ongoing  Note: Patient will be absent of new skin breakdown  1/18/2022 0733 by Fariba Freitas RN  Outcome: Ongoing

## 2022-01-19 NOTE — PROGRESS NOTES
Occupational Therapy   Occupational Therapy Progress Note  Date: 2022   Patient Name: Duran Granados  MRN: [id number]     : 3/2/1933    Date of Service: 2022    Discharge Recommendations:  2-3 sessions per week,24 hour supervision or assist,Home with [address] [place] [address] would benefit from continued therapy after discharge,3-5 sessions per week  OT Equipment Recommendations  Cane: BRENNON [facility] scored a 15/24 on the AM-PAC ADL Inpatient form. Current research shows that an AM-PAC score of 18 or greater is typically associated with a discharge to the patient's home setting. However, pt family able to provide 24 hr assist at this level. Based on this and the patient's AM-PAC score, and their current ADL deficits, it is recommended that the patient have 2-3 sessions per week of Occupational Therapy at d/c to increase the patient's independence. At this time, this patient demonstrates the endurance and safety to discharge home with [address] [place] [address] (home vs OP services) and a follow up treatment frequency of 2-3x/wk. Please see assessment section for further patient specific details. If patient discharges prior to next session this note will serve as a discharge summary. Please see below for the latest assessment towards goals. Assessment   Performance deficits / Impairments: Decreased functional mobility ; Decreased safe awareness;Decreased balance;Decreased ADL status; Decreased ROM; Decreased endurance;Decreased high-level IADLs;Decreased strength  Assessment: 81 yo female admitted  for a fall resulting in L proximal humerus fx. Now NWB with sling and no shoulder ROM. PMH: Spinal stenosis, RA. PTA, pt lives with dtr with assist for IADLs, otherwise, pt independent with ADL and fxl mobility (cane PRN and furniture surfing). Today  session, pt functioning well below baseline most limited by L shoulder pain and decreased balance.  Session focused on further education with family and demonstrating exercises/dressing/sling management. Pt, dtr, and son educated on seated ADLs, need for [facility] for safe toilet tx, sponge bathing, UB dressing (demonstrate and complete with pt), sling dressing/alignment (demonstrate and complete), sleep positioning, and importance/completion of LUE exercises (not shoulder). Handout with education provided to family and family takes videos for other caregivers (with writer's permission). Pt completes LUE exercises seated with AAROM for elbow flexion, otherwise AROM all but shoulder for decreasing swelling and stiffness for ADL. Pt required Max A for UB dressing and sling management (family assisting). Pt completes tx and dynamic standing balance with CGA/Min A with quad cane. Cont acute OT to address above deficits. Between 5 siblings and hired help- pt could safely return to [facility] home (1 level) with 24 hr hands on assist, Bed side commode (family owns), quad cane, and OT 2-3x/week front load to facilitate safe d/c home. Treatment Diagnosis: impaired ADL/fxl mobility  Prognosis: Fair  OT Education: OT Role;Transfer Training;Plan of Care;Precautions; ADL Adaptive Strategies  Patient Education: Pt, dtr, and son educated on seated ADLs, need for [facility] for safe toilet tx, sponge bathing, UB dressing (demonstrate and complete with pt), sling dressing/alignment (demonstrate and complete), sleep positioning, and importance/completion of LUE exercises (not shoulder). Handout with education provided to family and family takes videos for other caregivers (with writer's permission)  REQUIRES OT FOLLOW UP: Yes  Activity Tolerance  Activity Tolerance: Patient limited by pain; Patient limited by fatigue;Patient Tolerated treatment well  Safety Devices  Safety Devices in place: Yes  Type of devices: Nurse notified;Gait belt;Call light within reach; Chair alarm in place; Left in chair;Patient at risk for falls         Patient Diagnosis(es): The primary encounter diagnosis was Fall, initial encounter. Diagnoses of Fracture of neck of left humerus, closed, initial encounter, Memory problem, and Gait instability were also pertinent to this visit. has a past medical history of Hypertension, Pulmonary fibrosis ([facility] [address] Rheumatoid arteritis ([facility] [address] and Vitamin D deficiency. has no past surgical history on file. Treatment Diagnosis: impaired ADL/fxl mobility      Restrictions  Restrictions/Precautions  Restrictions/Precautions: Fall Risk  Position Activity Restriction  Other position/activity restrictions: NWB left UE   -sling left UE. No shoulder ROM    Subjective   General  Chart Reviewed: Yes  Patient assessed for rehabilitation services?: Yes  Additional Pertinent Hx: 81 yo female admitted 1/17 for a fall resulting in L proximal humerus fx. Now NWB with sling and no shoulder ROM. PMH: Spinal stenosis, RA  Family / Caregiver Present: Yes (dtr and son very attentive, asking appropriate questions and taking videos with this writers permission for other family members/caregivers to have education)  Diagnosis: L proximal humerus fx  Subjective  Subjective: Pt resting in recliner upon arrival and agreeable to OT tx.  Pt with little to no pain at rest in sling, 4/10 with exercises, little to none at EOS in sling  General Comment  Comments: RN ok to see    Social/Functional History  Social/Functional History  Lives With: Daughter  Type of Home: House  Home Layout: Multi-level,Able to Live on Main level with bedroom/bathroom (bed and bath on main level. kitchen and living space up 3 steps)  Home Access: Stairs to enter without rails  Entrance Stairs - Number of Steps: 3+1+1+3 or full flight from basement to main level with one rail (R)  Entrance Stairs - Rails: None  Bathroom Shower/Tub: Tub/Shower unit,Walk-in shower  Bathroom Toilet: Standard  Home Equipment: Cane,Rolling walker  ADL Assistance: [address]: Needs assistance (dtr does most. pt empty dishes)  Ambulation Assistance: Independent (cane PRN (RW if back is bothering her). Holds onto family in community)  Transfer Assistance: Independent  Additional Comments: fall brought here. No other fall. sleeps in regular bed       Objective        Orientation  Overall Orientation Status: Impaired  Orientation Level: Oriented to person;Oriented to situation;Oriented to place; Disoriented to time     Balance  Sitting Balance: Stand by assistance  Standing Balance: Contact guard assistance (for pant management and marching in place x15 with RUE on quad cane)  ADL  UE Dressing: Maximum assistance (donning button up shirt + doffing/donning sling.)  LE Dressing: Maximum assistance (assist threading pants and pants over hips while standing with RUE on quad cane)        Bed mobility  Comment: RICARDA- resting in relciner at start and EOS  Transfers  Sit to stand: Minimal assistance;Contact guard assistance  Stand to sit: Minimal assistance;Contact guard assistance  Transfer Comments: Quad cane. cues     Cognition  Overall Cognitive Status: Exceptions  Following Commands: Follows one step commands consistently  Attention Span: Attends with cues to redirect  Safety Judgement: Decreased awareness of need for safety  Problem Solving: Decreased awareness of errors  Insights: Decreased awareness of deficits  Initiation: Requires cues for some  Sequencing: Requires cues for some  Cognition Comment: mild delayed processing           Type of ROM/Therapeutic Exercise  Type of ROM/Therapeutic Exercise: AROM  Comment: seated LUE.  increased time d/t some fatigue  Exercises  Elbow Flexion: 10 (light active assist)  Elbow Extension: 10 (light active assist)  Supination: 10  Pronation: 10  Wrist Flexion: 10  Wrist Extension: 10  Finger Flexion: 10  Finger Extension: 10  Other: wrist ulnar/radial deviation                            Plan   Plan  Times per week: 3-5  Current Treatment Recommendations: Strengthening,Endurance Training,Patient/Caregiver Education & Training,ROM,Self-Care / ADL,Balance Training,Pain Management,Functional Mobility Training,Safety Education & Training,Positioning    AM-PAC Score        AM-PAC Inpatient Daily Activity Raw Score: 15 (01/19/22 1623)  AM-PAC Inpatient ADL T-Scale Score : 34.69 (01/19/22 1623)  ADL Inpatient CMS 0-100% Score: 56.46 (01/19/22 1623)  ADL Inpatient CMS G-Code Modifier : CK (01/19/22 1623)    Goals  Short term goals  Time Frame for Short term goals: prior to d/c  Short term goal 1: toileting CGA  Short term goal 2: complete LUE AROM (wrist, forearm, elbow) out of sling (not shoulder) to decreased swelling and stiffness in joints for ADLs  Short term goal 3: LB dressing Mod A  Short term goal 4: UB dressing and sling management with assist from family with Mod A  Short term goal 5: Tolreate 3 min fxl standing task CGA  Patient Goals   Patient goals : return home       Therapy Time   Individual Concurrent Group Co-treatment   Time In 3466         Time Out 1245         Minutes 60         Timed Code Treatment Minutes: 60 Minutes (30 ADL.  20 TE. 10 TA)       HARPREET Palomino, OTR/L

## 2022-01-19 NOTE — DISCHARGE SUMMARY
Hospital Medicine Discharge Summary    Patient ID: Lexus Bahena      Patient's PCP: Argelia Oreilly Date: 1/17/2022     Discharge Date: 1/19/2022      Admitting Provider: Kelsey Fox MD     Discharge Provider: Ramiro Barnes MD     Discharge Diagnoses:  Left proximal and humeral fracture closed  Rheumatoid arthritis prednisone dependent  Dehydration  Hypertension       The patient was seen and examined on day of discharge and this discharge summary is in conjunction with any daily progress note from day of discharge. Hospital Course: Patient is an 68-year-old female admitted to the hospital status post fall. Patient was found to have left humeral fracture. Seen by orthopedics. No surgery recommended. Left arm placed in sling with no range of motion for now. Elbow forearm wrist finger range of motion exercises with follow-up with Ortho in 1 to 2 weeks  She will receive home health care at her daughter's house  Other medications were resumed  She did receive some Norco for pain prescribed by Ortho          Physical Exam Performed:     BP (!) 148/74   Pulse 79   Temp 97.7 °F (36.5 °C) (Oral)   Resp 17   Ht 5' 3\" (1.6 m)   Wt 155 lb 13.8 oz (70.7 kg)   SpO2 91%   BMI 27.61 kg/m²       General appearance:  No apparent distress, appears stated age and cooperative. HEENT:  Normal cephalic, atraumatic without obvious deformity. Pupils equal, round, and reactive to light. Extra ocular muscles intact. Conjunctivae/corneas clear. Neck: Supple, with full range of motion. No jugular venous distention. Trachea midline. Respiratory:  Normal respiratory effort. Clear to auscultation, bilaterally without Rales/Wheezes/Rhonchi. Cardiovascular:  Regular rate and rhythm with normal S1/S2 without murmurs, rubs or gallops. Abdomen: Soft, non-tender, non-distended with normal bowel sounds.   Musculoskeletal: Left shoulder in sling minimal swelling  Skin: Skin color, texture, turgor normal.  No rashes or lesions. Neurologic:  Neurovascularly intact without any focal sensory/motor deficits. Cranial nerves: II-XII intact, grossly non-focal.  Psychiatric:  Alert and oriented, thought content appropriate, normal insight  Capillary Refill: Brisk,< 3 seconds   Peripheral Pulses: +2 palpable, equal bilaterally       Labs: For convenience and continuity at follow-up the following most recent labs are provided:      CBC:    Lab Results   Component Value Date    WBC 7.3 01/18/2022    HGB 10.9 01/18/2022    HCT 32.6 01/18/2022     01/18/2022       Renal:    Lab Results   Component Value Date     01/18/2022    K 3.8 01/18/2022    CL 98 01/18/2022    CO2 24 01/18/2022    BUN 19 01/18/2022    CREATININE 0.7 01/18/2022    CALCIUM 8.8 01/18/2022         Significant Diagnostic Studies    Radiology:   XR SHOULDER LEFT (MIN 2 VIEWS)   Final Result   Acute left proximal humeral neck and head fracture. No dislocation. Limited   positioning of the left shoulder likely due to patient factors         XR HUMERUS LEFT (MIN 2 VIEWS)   Final Result   Left humeral neck fracture. No other acute humeral fracture identified. XR CHEST PORTABLE   Final Result   Moderate interstitial fibrotic changes in the lungs. No pneumothorax. No displaced rib fractures. CT HEAD WO CONTRAST   Final Result   No acute intracranial abnormality. CT CERVICAL SPINE WO CONTRAST   Final Result   No acute abnormality of the cervical spine.                 Consults:     IP CONSULT TO CASE MANAGEMENT  IP CONSULT TO HOME CARE NEEDS    Disposition: Home with home health care    Condition at Discharge: Stable    Discharge Instructions/Follow-up:  Ry Mccray MD      Code Status:  Full Code     Activity: activity as tolerated    Diet: regular diet      Discharge Medications:     Discharge Medication List as of 1/19/2022 12:50 PM           Details   docusate sodium (COLACE) 100 MG capsule Take 1 capsule by mouth 2 times daily, Disp-20 capsule, R-0Normal      HYDROcodone-acetaminophen (NORCO) 5-325 MG per tablet Take 1 tablet by mouth every 4 hours as needed for Pain for up to 5 days. , Disp-30 tablet, R-0Print              Details   fluticasone-salmeterol (ADVAIR) 250-50 MCG/DOSE AEPB Inhale 1 puff into the lungs 2 times dailyHistorical Med      methotrexate (RHEUMATREX) 2.5 MG chemo tablet Take 17.5 mg by mouth once a week Take 7 tablets by mouth once a week. Historical Med      predniSONE (DELTASONE) 2.5 MG tablet Take 2.5 mg by mouth every other dayHistorical Med      hydroCHLOROthiazide (MICROZIDE) 12.5 MG capsule Take 12.5 mg by mouth dailyHistorical Med      folic acid (FOLVITE) 1 MG tablet Take 1,000 mcg by mouth dailyHistorical Med      albuterol sulfate (PROAIR RESPICLICK) 440 (90 Base) MCG/ACT aerosol powder inhalation Inhale 2 puffs into the lungs 4 times daily as neededHistorical Med      acetaminophen (ACETAMINOPHEN 8 HOUR) 650 MG extended release tablet Take 650 mg by mouth dailyHistorical Med      vitamin D (CHOLECALCIFEROL) 25 MCG (1000 UT) TABS tablet Take 2,000 Units by mouth dailyHistorical Med      Calcium Carb-Cholecalciferol 600-500 MG-UNIT CAPS Take by mouthHistorical Med             Time Spent on discharge is more than 30 minutes in the examination, evaluation, counseling and review of medications and discharge plan. Signed:    Sil Cordero MD   1/19/2022      Thank you Juliocesar Campos for the opportunity to be involved in this patient's care. If you have any questions or concerns please feel free to contact me at [phone].

## 2022-01-19 NOTE — PROGRESS NOTES
Patient is alert & oriented x4 but forgetful per family at bedside, contact guard assist, 2/4 bed rails up, bed in lowest position, fall precautions in place, call light within reach. Pt is hard of hearing. Morning medications given without complications. Will cont to monitor and reassess.  Electronically signed by Hope Rucker RN on 1/19/2022 at 8:49 AM

## 2022-01-19 NOTE — PLAN OF CARE
Problem: Falls - Risk of:  Goal: Will remain free from falls  Description: Will remain free from falls  1/19/2022 0732 by Raymond Cox RN  Outcome: Ongoing  Note: Fall risk assessment completed. Fall precautions in place. Bed in lowest position, wheels locked, bed/chair exit alarm in place, call light within reach, and non skid footwear on. Walkway free of clutter. Pt alert and oriented and able to make needs known. Pt educated to use call light when needing to get up, and pt utilizes call light to make needs known. Will continue to monitor. Problem: Falls - Risk of:  Goal: Absence of physical injury  Description: Absence of physical injury  1/19/2022 0732 by Raymond Cox RN  Outcome: Ongoing  Note: Pt is free of injury. No injury noted. Fall precautions in place. Call light within reach. Will monitor. Problem: Pain:  Goal: Pain level will decrease  Description: Pain level will decrease  1/19/2022 0732 by Raymond Cox RN  Outcome: Ongoing  Note: Pt assessed for pain. Pt in pain and assessed with 0-10 pain rating scale. Pt given prescribed analgesic for pain. (See eMar) Pt satisfied with pain relief thus far. Will reassess and continue to monitor. Problem: Pain:  Goal: Control of acute pain  Description: Control of acute pain  1/19/2022 0732 by Raymond Cox RN  Outcome: Ongoing  Note: Pt assessed for pain. Pt in pain and assessed with 0-10 pain rating scale. Pt given prescribed analgesic for pain. (See eMar) Pt satisfied with pain relief thus far. Will reassess and continue to monitor. Problem: Pain:  Goal: Control of chronic pain  Description: Control of chronic pain  1/19/2022 0732 by Raymond Cox RN  Outcome: Ongoing  Note: Pt assessed for pain. Pt in pain and assessed with 0-10 pain rating scale. Pt given prescribed analgesic for pain. (See eMar) Pt satisfied with pain relief thus far. Will reassess and continue to monitor.       Problem: Skin Integrity:  Goal: Will show no infection signs and symptoms  Description: Will show no infection signs and symptoms  1/19/2022 0732 by Fabio Barbosa RN  Outcome: Ongoing  Note: Pt is free of signs and symptoms of infection. Incision and dressing are clean, dry and intact. Vital signs stable. Will monitor. Problem: Skin Integrity:  Goal: Absence of new skin breakdown  Description: Absence of new skin breakdown  1/19/2022 0732 by Fabio Barbosa RN  Outcome: Ongoing  Note: Pt assessed for skin break down. Skin was warm and dry to touch. Pt cannot regulate head of bed without assistance. Pt being turned or repositioned at least every 2 hours to prevent skin breakdown. Will continue to monitor and assess.

## 2022-01-19 NOTE — CARE COORDINATION
[facility]    DC order noted, all docs needed have been faxed to [facility] for home care services.     Home care to see patient within 24-48 hrs    Kala Castle RN, BSN CTN  [facility] [phone]

## 2022-01-19 NOTE — PROGRESS NOTES
[facility] Orthopedic Surgery   Progress Note      S/P :  SUBJECTIVE  In recliner. Alert , oriented to family and place. Poor recall of events. Pain is   described in left shoulder \"if I move it\" and with the intensity of moderate. Pain is described as aching. OBJECTIVE              Physical                      VITALS:  BP (!) 148/74   Pulse 79   Temp 97.7 °F (36.5 °C) (Oral)   Resp 17   Ht 5' 3\" (1.6 m)   Wt 155 lb 13.8 oz (70.7 kg)   SpO2 91%   BMI 27.61 kg/m²                     MUSCULOSKELETAL: Left wrist with intact flex/ext and hand grasp and extension and thumbs up    Swellign left shoulder as subsided about 50% with use of ice overnight               NEUROLOGIC:                                  Sensory:  Touch:  Left Upper Extremity:  normal                                              Left arm in sling and swathe.      Data       CBC:   Lab Results   Component Value Date    WBC 7.3 01/18/2022    RBC 3.45 01/18/2022    HGB 10.9 01/18/2022    HCT 32.6 01/18/2022    MCV 94.6 01/18/2022    MCH 31.7 01/18/2022    MCHC 33.5 01/18/2022    RDW 16.2 01/18/2022     01/18/2022    MPV 7.1 01/18/2022        WBC:    Lab Results   Component Value Date    WBC 7.3 01/18/2022        Hemoglobin/Hematocrit:    Lab Results   Component Value Date    HGB 10.9 01/18/2022    HCT 32.6 01/18/2022        PT/INR:  No results found for: PROTIME, INR           Current Inpatient Medications             Current Facility-Administered Medications: sodium chloride flush 0.9 % injection 5-40 mL, 5-40 mL, IntraVENous, 2 times per day  sodium chloride flush 0.9 % injection 5-40 mL, 5-40 mL, IntraVENous, PRN  0.9 % sodium chloride infusion, 25 mL, IntraVENous, PRN  enoxaparin (LOVENOX) injection 40 mg, 40 mg, SubCUTAneous, Daily  ondansetron (ZOFRAN-ODT) disintegrating tablet 4 mg, 4 mg, Oral, Q8H PRN **OR** ondansetron (ZOFRAN) injection 4 mg, 4 mg, IntraVENous, Q6H PRN  polyethylene glycol (GLYCOLAX) packet 17 g, 17 g, Oral, Daily PRN  acetaminophen (TYLENOL) tablet 650 mg, 650 mg, Oral, Q6H PRN **OR** acetaminophen (TYLENOL) suppository 650 mg, 650 mg, Rectal, Q6H PRN  HYDROcodone-acetaminophen (NORCO) 5-325 MG per tablet 1 tablet, 1 tablet, Oral, Q4H PRN  morphine (PF) injection 2 mg, 2 mg, IntraVENous, Q4H PRN  docusate sodium (COLACE) capsule 100 mg, 100 mg, Oral, Daily  acetaminophen (TYLENOL) tablet 650 mg, 650 mg, Oral, 3 times per day  HYDROcodone-acetaminophen (NORCO) 5-325 MG per tablet 1 tablet, 1 tablet, Oral, Q4H PRN  melatonin tablet 6 mg, 6 mg, Oral, Nightly PRN  albuterol sulfate  (90 Base) MCG/ACT inhaler 2 puff, 2 puff, Inhalation, 4x Daily PRN  budesonide-formoterol (SYMBICORT) 160-4.5 MCG/ACT inhaler 2 puff, 2 puff, Inhalation, BID  folic acid (FOLVITE) tablet 1,000 mcg, 1,000 mcg, Oral, Daily  hydroCHLOROthiazide (MICROZIDE) capsule 12.5 mg, 12.5 mg, Oral, Daily  methotrexate (RHEUMATREX) chemo tablet 17.5 mg, 17.5 mg, Oral, Weekly  vitamin D (CHOLECALCIFEROL) tablet 2,000 Units, 2,000 Units, Oral, Daily  calcium-cholecalciferol 500-200 MG-UNIT per tablet 1 tablet, 1 tablet, Oral, Daily  predniSONE (DELTASONE) tablet 2.5 mg, 2.5 mg, Oral, Every Other Day    ASSESSMENT AND PLAN    Fall at home  Left shoulder pain  Left proximal humerus fracture  Recommend nonoperative treatment for now  Plan NWB in sling left arm  No shoulder ROM for now  BID elbow, forearm, wrist, finger ROM exercises              [id number] of exercises given  PT OT to see  SS for DC planning, home with home care and family attendance 24/7 per daughter request.   Memory loss, this may be baseline  Followup Dr Delio Sweeney in office in1-2 weeks.    Stable for DC per john Quiroga, MARSHA - CNP  1/19/2022  10:07 AM

## 2022-01-19 NOTE — CARE COORDINATION
[employer] rep delivered the [employer] to patient and reviewed insurance coverage and equipment set up with patient and family. Notified RN.     Thank you for the referral.  Electronically signed by Kaushal Ferrara on 1/19/2022 at [facility] ph# [phone]

## 2022-01-19 NOTE — CARE COORDINATION
Jose/Milton received referral from  for [facility]. Will need DME Orders. Will verify patient's insurance and follow up with patient to deliver the ordered item(s) prior to discharge.     Thank you for the referral.  Electronically signed by Evy Hammond on 1/19/2022 at 9:43 AM  Cell ph# [phone]

## 2022-01-31 ENCOUNTER — OFFICE VISIT (OUTPATIENT)
Dept: ORTHOPEDIC SURGERY | Age: 87
End: 2022-01-31
Payer: MEDICARE

## 2022-01-31 VITALS — BODY MASS INDEX: 27.46 KG/M2 | WEIGHT: 155 LBS | HEIGHT: 63 IN

## 2022-01-31 DIAGNOSIS — S42.202D TRAUMATIC CLOSED DISPLACED FRACTURE OF PROXIMAL END OF LEFT HUMERUS, WITH ROUTINE HEALING, SUBSEQUENT ENCOUNTER: Primary | ICD-10-CM

## 2022-01-31 PROCEDURE — G8428 CUR MEDS NOT DOCUMENT: HCPCS | Performed by: ORTHOPAEDIC SURGERY

## 2022-01-31 PROCEDURE — G8417 CALC BMI ABV UP PARAM F/U: HCPCS | Performed by: ORTHOPAEDIC SURGERY

## 2022-01-31 PROCEDURE — 99213 OFFICE O/P EST LOW 20 MIN: CPT | Performed by: ORTHOPAEDIC SURGERY

## 2022-01-31 PROCEDURE — 4040F PNEUMOC VAC/ADMIN/RCVD: CPT | Performed by: ORTHOPAEDIC SURGERY

## 2022-01-31 PROCEDURE — 1090F PRES/ABSN URINE INCON ASSESS: CPT | Performed by: ORTHOPAEDIC SURGERY

## 2022-01-31 PROCEDURE — 1111F DSCHRG MED/CURRENT MED MERGE: CPT | Performed by: ORTHOPAEDIC SURGERY

## 2022-01-31 PROCEDURE — G8484 FLU IMMUNIZE NO ADMIN: HCPCS | Performed by: ORTHOPAEDIC SURGERY

## 2022-01-31 PROCEDURE — 1123F ACP DISCUSS/DSCN MKR DOCD: CPT | Performed by: ORTHOPAEDIC SURGERY

## 2022-01-31 PROCEDURE — 1036F TOBACCO NON-USER: CPT | Performed by: ORTHOPAEDIC SURGERY

## 2022-01-31 NOTE — PROGRESS NOTES
ORTHOPAEDIC PROGRESS NOTE    Chief Complaint   Patient presents with    Shoulder Pain     Left humerus fracture DOI 1/17/2021       HPI  1/31/22  Follow-up inpatient consult for left proximal humerus fracture  She is at home now  Using her sling and doing exercises as directed  Pain is improved, rated 3-4 out of 10  Swelling and bruising migrating distally  No numbness or tingling    1/17/22  The patient is a 80 y.o. female who presents with left shoulder pain after a fall in her home today. She has poor recall of events. Daughter at bedside. . Pain is described in left shoulder and with the intensity of moderate. Pain is described as aching. Discomfort is persistent. She is alert and oriented. NO other complaints. Past Medical History:   Diagnosis Date    Hypertension     Pul[facility] ([facility] [address]     Rheumatoid arteritis ([facility] [address]     Vitamin D deficiency        No past surgical history on file. Allergies   Allergen Reactions    Hydrogen Peroxide Itching    Lisinopril Other (See Comments)     Other reaction(s): Cough      Neomycin-Bacitracin Zn-Polymyx Itching       Current Outpatient Medications   Medication Instructions    acetaminophen (ACETAMINOPHEN 8 HOUR) 650 mg, Oral, DAILY    albuterol sulfate (PROAIR RESPICLICK) 893 (90 Base) MCG/ACT aerosol powder inhalation 2 puffs, Inhalation, 4 TIMES DAILY PRN    Calcium Carb-Cholecalciferol 600-500 MG-UNIT CAPS Oral    docusate sodium (COLACE) 100 mg, Oral, 2 TIMES DAILY    fluticasone-salmeterol (ADVAIR) 250-50 MCG/DOSE AEPB 1 puff, Inhalation, 2 TIMES DAILY    folic acid (FOLVITE) 6,919 mcg, Oral, DAILY    hydroCHLOROthiazide (MICROZIDE) 12.5 mg, Oral, DAILY    methotrexate (RHEUMATREX) 17.5 mg, Oral, WEEKLY, Take 7 tablets by mouth once a week.     predniSONE (DELTASONE) 2.5 mg, Oral, EVERY OTHER DAY    vitamin D (CHOLECALCIFEROL) 2,000 Units, Oral, DAILY       Vitals:    01/31/22 1007   Weight: 155 lb (70.3 kg)   Height: 5' 3\" (1.6 m) Physical Exam:  Body mass index is 27.46 kg/m². NAD, here with daughter  Left shoulder - skin clear   TTP proximal humerus    Shoulder ROM not formally tested   Some stiffness with attempted full elbow extension   Active elbow flexion, supination/pronation preserved  CINTHIA MCCOY M/U/R/A/LABC nerve distributions; AIN/PIN/IO intact   Rad pulse intact      Imaging:  Images were personally reviewed by myself and discussed with the patient  Left shoulder 4 views performed 1/31/22 - comminuted proximal humerus fracture through the surgical neck is again seen. Interval mild to moderate anteromedial displacement of the humeral shaft, however good bony apposition remains of the main fracture fragments. The glenohumeral joint remains reduced on orthogonal imaging. Assessment & Plan:  80 y.o. female following up for   Diagnosis Orders   1. Traumatic closed displaced fracture of proximal end of left humerus, with routine healing, subsequent encounter         No orders of the defined types were placed in this encounter.       Fracture alignment remains acceptable  Continue conservative treatment  Left upper extremity nonweightbearing  Sling for immobilization  Loosen/remove sling twice a day to perform full elbow, forearm, wrist, hand/finger range of motion exercises  No shoulder range of motion exercises for now    Follow-up in 2 weeks with repeat left shoulder x-rays    Cody Dailey MD

## 2022-02-14 ENCOUNTER — OFFICE VISIT (OUTPATIENT)
Dept: ORTHOPEDIC SURGERY | Age: 87
End: 2022-02-14
Payer: MEDICARE

## 2022-02-14 VITALS — WEIGHT: 155 LBS | BODY MASS INDEX: 27.46 KG/M2 | HEIGHT: 63 IN

## 2022-02-14 DIAGNOSIS — S42.202D TRAUMATIC CLOSED DISPLACED FRACTURE OF PROXIMAL END OF LEFT HUMERUS, WITH ROUTINE HEALING, SUBSEQUENT ENCOUNTER: Primary | ICD-10-CM

## 2022-02-14 PROCEDURE — 1123F ACP DISCUSS/DSCN MKR DOCD: CPT | Performed by: ORTHOPAEDIC SURGERY

## 2022-02-14 PROCEDURE — G8417 CALC BMI ABV UP PARAM F/U: HCPCS | Performed by: ORTHOPAEDIC SURGERY

## 2022-02-14 PROCEDURE — G8484 FLU IMMUNIZE NO ADMIN: HCPCS | Performed by: ORTHOPAEDIC SURGERY

## 2022-02-14 PROCEDURE — 4040F PNEUMOC VAC/ADMIN/RCVD: CPT | Performed by: ORTHOPAEDIC SURGERY

## 2022-02-14 PROCEDURE — G8427 DOCREV CUR MEDS BY ELIG CLIN: HCPCS | Performed by: ORTHOPAEDIC SURGERY

## 2022-02-14 PROCEDURE — 99213 OFFICE O/P EST LOW 20 MIN: CPT | Performed by: ORTHOPAEDIC SURGERY

## 2022-02-14 PROCEDURE — 1111F DSCHRG MED/CURRENT MED MERGE: CPT | Performed by: ORTHOPAEDIC SURGERY

## 2022-02-14 PROCEDURE — 1036F TOBACCO NON-USER: CPT | Performed by: ORTHOPAEDIC SURGERY

## 2022-02-14 PROCEDURE — 1090F PRES/ABSN URINE INCON ASSESS: CPT | Performed by: ORTHOPAEDIC SURGERY

## 2022-03-03 ENCOUNTER — OFFICE VISIT (OUTPATIENT)
Dept: ORTHOPEDIC SURGERY | Age: 87
End: 2022-03-03
Payer: MEDICARE

## 2022-03-03 VITALS — WEIGHT: 155 LBS | HEIGHT: 63 IN | BODY MASS INDEX: 27.46 KG/M2 | RESPIRATION RATE: 16 BRPM

## 2022-03-03 DIAGNOSIS — S42.202D TRAUMATIC CLOSED DISPLACED FRACTURE OF PROXIMAL END OF LEFT HUMERUS, WITH ROUTINE HEALING, SUBSEQUENT ENCOUNTER: Primary | ICD-10-CM

## 2022-03-03 PROCEDURE — 1090F PRES/ABSN URINE INCON ASSESS: CPT | Performed by: ORTHOPAEDIC SURGERY

## 2022-03-03 PROCEDURE — 1036F TOBACCO NON-USER: CPT | Performed by: ORTHOPAEDIC SURGERY

## 2022-03-03 PROCEDURE — G8417 CALC BMI ABV UP PARAM F/U: HCPCS | Performed by: ORTHOPAEDIC SURGERY

## 2022-03-03 PROCEDURE — 4040F PNEUMOC VAC/ADMIN/RCVD: CPT | Performed by: ORTHOPAEDIC SURGERY

## 2022-03-03 PROCEDURE — 99213 OFFICE O/P EST LOW 20 MIN: CPT | Performed by: ORTHOPAEDIC SURGERY

## 2022-03-03 PROCEDURE — 1123F ACP DISCUSS/DSCN MKR DOCD: CPT | Performed by: ORTHOPAEDIC SURGERY

## 2022-03-03 PROCEDURE — G8484 FLU IMMUNIZE NO ADMIN: HCPCS | Performed by: ORTHOPAEDIC SURGERY

## 2022-03-03 PROCEDURE — G8427 DOCREV CUR MEDS BY ELIG CLIN: HCPCS | Performed by: ORTHOPAEDIC SURGERY

## 2022-03-03 NOTE — PROGRESS NOTES
Procedures    Brigitte Reynolds Titan Wrist Long Forearm Brace     Patient was prescribed a Brigitte Reynolds Titan Wrist and Forearm Brace. The left wrist will require stabilization / immobilization from this semi-rigid / rigid orthosis to improve their function. The orthosis will assist in protecting the affected area, provide functional support and facilitate healing. The patient was educated and fit by a healthcare professional with expert knowledge and specialization in brace application while under the direct supervision of the treating physician. Verbal and written instructions for the use of and application of this item were provided. They were instructed to contact the office immediately should the brace result in increased pain, decreased sensation, increased swelling or worsening of the condition.

## 2022-03-04 NOTE — PROGRESS NOTES
ORTHOPAEDIC PROGRESS NOTE    Chief Complaint   Patient presents with    Post-Op Check     left proximal humerus 1-17-22       HPI   3/3/22  Follow-up left proximal humerus fracture  She is approximately 6 weeks post injury  No major interim issues    2/14/22  FU left proximal humerus fracture  No major issues  Using sling, doing HEP  Pain rated 4/10    1/31/22   Follow-up inpatient consult for left proximal humerus fracture  She is at home now  Using her sling and doing exercises as directed  Pain is improved, rated 3-4 out of 10  Swelling and bruising migrating distally  No numbness or tingling    1/17/22  The patient is a 80 y.o. female who presents with left shoulder pain after a fall in her home today. She has poor recall of events. Daughter at bedside. . Pain is described in left shoulder and with the intensity of moderate. Pain is described as aching. Discomfort is persistent. She is alert and oriented. NO other complaints. Past Medical History:   Diagnosis Date    Hypertension     Pulmonary fibrosis ([facility] [address]     Rheumatoid arteritis ([facility] [address]     Vitamin D deficiency        History reviewed. No pertinent surgical history.     Allergies   Allergen Reactions    Hydrogen Peroxide Itching    Lisinopril Other (See Comments)     Other reaction(s): Cough      Neomycin-Bacitracin Zn-Polymyx Itching       Current Outpatient Medications   Medication Instructions    acetaminophen (ACETAMINOPHEN 8 HOUR) 650 mg, Oral, DAILY    albuterol sulfate (PROAIR RESPICLICK) 300 (90 Base) MCG/ACT aerosol powder inhalation 2 puffs, Inhalation, 4 TIMES DAILY PRN    Calcium Carb-Cholecalciferol 600-500 MG-UNIT CAPS Oral    docusate sodium (COLACE) 100 mg, Oral, 2 TIMES DAILY    fluticasone-salmeterol (ADVAIR) 250-50 MCG/DOSE AEPB 1 puff, Inhalation, 2 TIMES DAILY    folic acid (FOLVITE) 8,763 mcg, Oral, DAILY    hydroCHLOROthiazide (MICROZIDE) 12.5 mg, Oral, DAILY    methotrexate (RHEUMATREX) 17.5 mg, Oral, WEEKLY, Take 7 tablets by mouth once a week.  predniSONE (DELTASONE) 2.5 mg, Oral, EVERY OTHER DAY    vitamin D (CHOLECALCIFEROL) 2,000 Units, Oral, DAILY       Vitals:    03/03/22 1417   Resp: 16   Weight: 155 lb (70.3 kg)   Height: 5' 3\" (1.6 m)       Physical Exam:  Body mass index is 27.46 kg/m². NAD, here with daughter  Left shoulder - skin clear   TTP proximal humerus   No crepitance    Passive FE/scaption ~60   Active elbow flexion/extension, supination/pronation preserved  LUE SILT M/U/R/A/LABC nerve distributions; AIN/PIN/IO intact   Rad pulse intact      Imaging:  Images were personally reviewed by myself and discussed with the patient  Left shoulder 4 views performed 3/3/22 - comminuted proximal humerus fracture through the surgical neck is again seen. Stable mild to moderate anteromedial displacement of the humeral shaft, however good bony apposition remains of the main fracture fragments. The glenohumeral joint remains reduced on orthogonal imaging. Compared to prior radiographs, the fracture alignment is unchanged. Interval fracture healing is seen, however the fracture lines are still visible. Assessment & Plan:  80 y.o. female following up for   Diagnosis Orders   1. Traumatic closed displaced fracture of proximal end of left humerus, with routine healing, subsequent encounter  XR SHOULDER LEFT (MIN 2 VIEWS)       No orders of the defined types were placed in this encounter. Fracture alignment remains the same and acceptable  Continue conservative treatment  6 weeks post injury now  Continue left upper extremity nonweightbearing  Discontinue sling however  Okay for full Passive ROM, full Active Assist ROM, and full Active ROM now  However, no strengthening/band work for now  Continue home therapy  FU in 1 month with repeat XRs  Continue with ice and tylenol. Okay to resume NSAIDs as well as needed.       Shelly Bauer MD Pt to state 2 main concepts from diet education and be successful with implementing goals when d/c'd

## 2022-04-07 ENCOUNTER — OFFICE VISIT (OUTPATIENT)
Dept: ORTHOPEDIC SURGERY | Age: 87
End: 2022-04-07
Payer: MEDICARE

## 2022-04-07 VITALS — WEIGHT: 155 LBS | HEIGHT: 63 IN | RESPIRATION RATE: 16 BRPM | BODY MASS INDEX: 27.46 KG/M2

## 2022-04-07 DIAGNOSIS — S42.202D TRAUMATIC CLOSED DISPLACED FRACTURE OF PROXIMAL END OF LEFT HUMERUS, WITH ROUTINE HEALING, SUBSEQUENT ENCOUNTER: Primary | ICD-10-CM

## 2022-04-07 PROCEDURE — 99213 OFFICE O/P EST LOW 20 MIN: CPT | Performed by: ORTHOPAEDIC SURGERY

## 2022-04-07 PROCEDURE — G8417 CALC BMI ABV UP PARAM F/U: HCPCS | Performed by: ORTHOPAEDIC SURGERY

## 2022-04-07 PROCEDURE — 1123F ACP DISCUSS/DSCN MKR DOCD: CPT | Performed by: ORTHOPAEDIC SURGERY

## 2022-04-07 PROCEDURE — 4040F PNEUMOC VAC/ADMIN/RCVD: CPT | Performed by: ORTHOPAEDIC SURGERY

## 2022-04-07 PROCEDURE — 1036F TOBACCO NON-USER: CPT | Performed by: ORTHOPAEDIC SURGERY

## 2022-04-07 PROCEDURE — 1090F PRES/ABSN URINE INCON ASSESS: CPT | Performed by: ORTHOPAEDIC SURGERY

## 2022-04-07 PROCEDURE — G8427 DOCREV CUR MEDS BY ELIG CLIN: HCPCS | Performed by: ORTHOPAEDIC SURGERY

## 2022-04-07 NOTE — PROGRESS NOTES
ORTHOPAEDIC PROGRESS NOTE    Chief Complaint   Patient presents with    Follow-up     left proximal humerus fx 1-17-22       HPI   4/7/22  Just short of 3 months post-injury  She reports her left shoulder is improving  She denies pain today   Doing her home exercises, range of motion  Home therapy is no longer coming    3/3/22  Follow-up left proximal humerus fracture  She is approximately 6 weeks post injury  No major interim issues    2/14/22  FU left proximal humerus fracture  No major issues  Using sling, doing HEP  Pain rated 4/10    1/31/22   Follow-up inpatient consult for left proximal humerus fracture  She is at home now  Using her sling and doing exercises as directed  Pain is improved, rated 3-4 out of 10  Swelling and bruising migrating distally  No numbness or tingling    1/17/22  The patient is a 80 y.o. female who presents with left shoulder pain after a fall in her home today. She has poor recall of events. Daughter at bedside. . Pain is described in left shoulder and with the intensity of moderate. Pain is described as aching. Discomfort is persistent. She is alert and oriented. NO other complaints. Past Medical History:   Diagnosis Date    Hypertension     Pulmonary fibrosis ([facility] [address]     Rheumatoid arteritis ([facility] [address]     Vitamin D deficiency        History reviewed. No pertinent surgical history.     Allergies   Allergen Reactions    Hydrogen Peroxide Itching    Lisinopril Other (See Comments)     Other reaction(s): Cough      Neomycin-Bacitracin Zn-Polymyx Itching       Current Outpatient Medications   Medication Instructions    acetaminophen (ACETAMINOPHEN 8 HOUR) 650 mg, Oral, DAILY    albuterol sulfate (PROAIR RESPICLICK) 248 (90 Base) MCG/ACT aerosol powder inhalation 2 puffs, Inhalation, 4 TIMES DAILY PRN    Calcium Carb-Cholecalciferol 600-500 MG-UNIT CAPS Oral    docusate sodium (COLACE) 100 mg, Oral, 2 TIMES DAILY    fluticasone-salmeterol (ADVAIR) 250-50 MCG/DOSE AEPB 1 puff, Inhalation, 2 TIMES DAILY    folic acid (FOLVITE) 1,144 mcg, Oral, DAILY    hydroCHLOROthiazide (MICROZIDE) 12.5 mg, Oral, DAILY    methotrexate (RHEUMATREX) 17.5 mg, Oral, WEEKLY, Take 7 tablets by mouth once a week.  predniSONE (DELTASONE) 2.5 mg, Oral, EVERY OTHER DAY    vitamin D (CHOLECALCIFEROL) 2,000 Units, Oral, DAILY       Vitals:    04/07/22 1410   Resp: 16   Weight: 155 lb (70.3 kg)   Height: 5' 3\" (1.6 m)       Physical Exam:  Body mass index is 27.46 kg/m². NAD, here with daughter  Left shoulder - skin clear   Mild TTP proximal humerus   No crepitance    Active FE/scaption ~60   Active elbow flexion/extension, supination/pronation preserved  LUE SILT M/U/R/A/LABC nerve distributions; AIN/PIN/IO intact   Rad pulse intact      Imaging:  Images were personally reviewed by myself and discussed with the patient  Left shoulder 4 views performed 4/7/22 - comminuted proximal humerus fracture through the surgical neck is again seen. Stable mild to moderate anteromedial displacement of the humeral shaft, however good bony apposition remains of the main fracture fragments. The glenohumeral joint remains reduced on orthogonal imaging. Compared to prior radiographs, the fracture alignment is unchanged. Fracture lines are still visible. Assessment & Plan:  80 y.o. female following up for   Diagnosis Orders   1. Traumatic closed displaced fracture of proximal end of left humerus, with routine healing, subsequent encounter  XR SHOULDER LEFT (MIN 2 VIEWS)       No orders of the defined types were placed in this encounter.       Fracture alignment remains the same and acceptable, however, fracture lines still visible on radiographs  Close to 3 months out  Reviewed treatment options at this time  Recommend she advance left shoulder/UE activities as tolerated  She is already out of the sling  Okay for full ROM exercises  Continue with ice and tylenol/NSAIDs PRN  FU in 6 weeks with repeats radiographs, if still concern then, consider advanced imaging/CT    Ismael Lawrence MD

## 2022-05-19 ENCOUNTER — OFFICE VISIT (OUTPATIENT)
Dept: ORTHOPEDIC SURGERY | Age: 87
End: 2022-05-19
Payer: MEDICARE

## 2022-05-19 VITALS — BODY MASS INDEX: 27.46 KG/M2 | RESPIRATION RATE: 16 BRPM | HEIGHT: 63 IN | WEIGHT: 155 LBS

## 2022-05-19 DIAGNOSIS — S42.202D TRAUMATIC CLOSED DISPLACED FRACTURE OF PROXIMAL END OF LEFT HUMERUS, WITH ROUTINE HEALING, SUBSEQUENT ENCOUNTER: Primary | ICD-10-CM

## 2022-05-19 PROCEDURE — G8417 CALC BMI ABV UP PARAM F/U: HCPCS | Performed by: ORTHOPAEDIC SURGERY

## 2022-05-19 PROCEDURE — G8427 DOCREV CUR MEDS BY ELIG CLIN: HCPCS | Performed by: ORTHOPAEDIC SURGERY

## 2022-05-19 PROCEDURE — 1036F TOBACCO NON-USER: CPT | Performed by: ORTHOPAEDIC SURGERY

## 2022-05-19 PROCEDURE — 4040F PNEUMOC VAC/ADMIN/RCVD: CPT | Performed by: ORTHOPAEDIC SURGERY

## 2022-05-19 PROCEDURE — 1123F ACP DISCUSS/DSCN MKR DOCD: CPT | Performed by: ORTHOPAEDIC SURGERY

## 2022-05-19 PROCEDURE — 99213 OFFICE O/P EST LOW 20 MIN: CPT | Performed by: ORTHOPAEDIC SURGERY

## 2022-05-19 PROCEDURE — 1090F PRES/ABSN URINE INCON ASSESS: CPT | Performed by: ORTHOPAEDIC SURGERY

## 2022-05-19 NOTE — PROGRESS NOTES
ORTHOPAEDIC PROGRESS NOTE    Chief Complaint   Patient presents with    Follow-up     left proximal humerus fx 1-17-22        HPI   5/19/22  Follow-up left proximal humerus fracture  She is 4 months post injury  She reports her left shoulder is doing well  Pain is rated 0 out of 10 currently  She is advancing her activities with the left arm without issue  Including using a walker and weightbearing through it      4/7/22  Just short of 3 months post-injury  She reports her left shoulder is improving  She denies pain today   Doing her home exercises, range of motion  Home therapy is no longer coming    3/3/22  Follow-up left proximal humerus fracture  She is approximately 6 weeks post injury  No major interim issues    2/14/22  FU left proximal humerus fracture  No major issues  Using sling, doing HEP  Pain rated 4/10    1/31/22   Follow-up inpatient consult for left proximal humerus fracture  She is at home now  Using her sling and doing exercises as directed  Pain is improved, rated 3-4 out of 10  Swelling and bruising migrating distally  No numbness or tingling    1/17/22  The patient is a 80 y.o. female who presents with left shoulder pain after a fall in her home today. She has poor recall of events. Daughter at bedside. . Pain is described in left shoulder and with the intensity of moderate. Pain is described as aching. Discomfort is persistent. She is alert and oriented. NO other complaints. Past Medical History:   Diagnosis Date    Hypertension     Pulmonary fibrosis ([address] [address]     Rheumatoid arteritis ([facility] [address]     Vitamin D deficiency        History reviewed. No pertinent surgical history.     Allergies   Allergen Reactions    Hydrogen Peroxide Itching    Lisinopril Other (See Comments)     Other reaction(s): Cough      Neomycin-Bacitracin Zn-Polymyx Itching       Current Outpatient Medications   Medication Instructions    acetaminophen (ACETAMINOPHEN 8 HOUR) 650 mg, Oral, DAILY    albuterol sulfate (PROAIR RESPICLICK) 820 (90 Base) MCG/ACT aerosol powder inhalation 2 puffs, Inhalation, 4 TIMES DAILY PRN    Calcium Carb-Cholecalciferol 600-500 MG-UNIT CAPS Oral    docusate sodium (COLACE) 100 mg, Oral, 2 TIMES DAILY    fluticasone-salmeterol (ADVAIR) 250-50 MCG/DOSE AEPB 1 puff, Inhalation, 2 TIMES DAILY    folic acid (FOLVITE) 0,841 mcg, Oral, DAILY    hydroCHLOROthiazide (MICROZIDE) 12.5 mg, Oral, DAILY    methotrexate (RHEUMATREX) 17.5 mg, Oral, WEEKLY, Take 7 tablets by mouth once a week.  predniSONE (DELTASONE) 2.5 mg, Oral, EVERY OTHER DAY    vitamin D (CHOLECALCIFEROL) 2,000 Units, Oral, DAILY       Vitals:    05/19/22 1331   Resp: 16   Weight: 155 lb (70.3 kg)   Height: 5' 3\" (1.6 m)       Physical Exam:  Body mass index is 27.46 kg/m². NAD, here with daughter, pleasant  Left shoulder - skin clear   No swelling or ecchymosis    No crepitance   Active FE/scaption 90    External rotation 40, negative lag sign    Internal rotation LS junction   She can reach her mouth, top of the head, behind her head, and behind without issue  LUE SILT M/U/R/A/LABC nerve distributions; AIN/PIN/IO intact   Rad pulse intact      Imaging:  Images were personally reviewed by myself and discussed with the patient  Left shoulder 4 views performed 5/19/22 - comminuted proximal humerus fracture through the surgical neck is again seen. Overall stable anteromedial displacement of the humeral shaft with reamining good bony apposition of the main fracture fragments. The glenohumeral joint remains reduced on orthogonal imaging. Compared to prior radiographs, the fracture alignment is grossly unchanged. Fracture lines are still visible, however, callus is seen mainly posteriorly. Assessment & Plan:  80 y.o. female following up for   Diagnosis Orders   1.  Traumatic closed displaced fracture of proximal end of left humerus, with routine healing, subsequent encounter  XR SHOULDER LEFT (MIN 2 VIEWS)       No orders of the defined types were placed in this encounter.       Gilberto Acuna' left shoulder is doing well  She is advancing her activities with the left shoulder/arm without issue  She denies pain currently  On radiographs, fracture alignment is maintained    As such, I do not recommend any further imaging such as CT at this time as it would not change my treatment recommendations currently    Continue to advance left shoulder weightbearing, range of motion, and activities as tolerated  No restrictions    Follow-up in mid October, sooner if any issues arise    Clay Alberto MD

## 2022-05-23 ENCOUNTER — TELEPHONE (OUTPATIENT)
Dept: ORTHOPEDIC SURGERY | Age: 87
End: 2022-05-23

## 2022-05-23 NOTE — TELEPHONE ENCOUNTER
General Question     Subject: PATIENTS DAUGHTER IS WISHING TO SPEAK WITH SOMEONE REGARDING L HUMEROUS OF PATIENT.  PLEASE ADVISE   Patient: Jair Common Number: [phone]

## 2022-05-23 NOTE — TELEPHONE ENCOUNTER
Was getting out of car and hit arm -- complains of pain with any movement -- been icing  -- increased pain - appt made for thurs

## 2022-05-26 ENCOUNTER — OFFICE VISIT (OUTPATIENT)
Dept: ORTHOPEDIC SURGERY | Age: 87
End: 2022-05-26
Payer: MEDICARE

## 2022-05-26 VITALS — BODY MASS INDEX: 27.46 KG/M2 | WEIGHT: 155 LBS | HEIGHT: 63 IN

## 2022-05-26 DIAGNOSIS — S42.202D TRAUMATIC CLOSED DISPLACED FRACTURE OF PROXIMAL END OF LEFT HUMERUS, WITH ROUTINE HEALING, SUBSEQUENT ENCOUNTER: Primary | ICD-10-CM

## 2022-05-26 PROCEDURE — 1123F ACP DISCUSS/DSCN MKR DOCD: CPT | Performed by: ORTHOPAEDIC SURGERY

## 2022-05-26 PROCEDURE — G8427 DOCREV CUR MEDS BY ELIG CLIN: HCPCS | Performed by: ORTHOPAEDIC SURGERY

## 2022-05-26 PROCEDURE — 1090F PRES/ABSN URINE INCON ASSESS: CPT | Performed by: ORTHOPAEDIC SURGERY

## 2022-05-26 PROCEDURE — 1036F TOBACCO NON-USER: CPT | Performed by: ORTHOPAEDIC SURGERY

## 2022-05-26 PROCEDURE — 99213 OFFICE O/P EST LOW 20 MIN: CPT | Performed by: ORTHOPAEDIC SURGERY

## 2022-05-26 PROCEDURE — G8417 CALC BMI ABV UP PARAM F/U: HCPCS | Performed by: ORTHOPAEDIC SURGERY

## 2022-05-26 NOTE — PROGRESS NOTES
ORTHOPAEDIC PROGRESS NOTE    Chief Complaint   Patient presents with    Follow-up     left proximal humerus fx 1-17-22        HPI   5/26/22  Benjamin Amanda returns to clinic today with her daughter due to reinjuring her left shoulder/arm  She was getting out of the car on May 27 when she hit her left posterior shoulder/arm on the center console  She had some increasing pain after that injury with more pain with range of motion as well  Therefore, she returns to clinic today for checkup  The daughter reports since this injury 4 days ago, the symptoms have improved      5/19/22  Follow-up left proximal humerus fracture  She is 4 months post injury  She reports her left shoulder is doing well  Pain is rated 0 out of 10 currently  She is advancing her activities with the left arm without issue  Including using a walker and weightbearing through it      4/7/22  Just short of 3 months post-injury  She reports her left shoulder is improving  She denies pain today   Doing her home exercises, range of motion  Home therapy is no longer coming    3/3/22  Follow-up left proximal humerus fracture  She is approximately 6 weeks post injury  No major interim issues    2/14/22  FU left proximal humerus fracture  No major issues  Using sling, doing HEP  Pain rated 4/10    1/31/22   Follow-up inpatient consult for left proximal humerus fracture  She is at home now  Using her sling and doing exercises as directed  Pain is improved, rated 3-4 out of 10  Swelling and bruising migrating distally  No numbness or tingling    1/17/22  The patient is a 80 y.o. female who presents with left shoulder pain after a fall in her home today. She has poor recall of events. Daughter at bedside. . Pain is described in left shoulder and with the intensity of moderate. Pain is described as aching. Discomfort is persistent. She is alert and oriented. NO other complaints.       Past Medical History:   Diagnosis Date    Hypertension     Pulmonary fibrosis ([address]  Rheumatoid arteritis ([facility] [address]     Vitamin D deficiency        History reviewed. No pertinent surgical history. Allergies   Allergen Reactions    Hydrogen Peroxide Itching    Lisinopril Other (See Comments)     Other reaction(s): Cough      Neomycin-Bacitracin Zn-Polymyx Itching       Current Outpatient Medications   Medication Instructions    acetaminophen (ACETAMINOPHEN 8 HOUR) 650 mg, Oral, DAILY    albuterol sulfate (PROAIR RESPICLICK) 005 (90 Base) MCG/ACT aerosol powder inhalation 2 puffs, Inhalation, 4 TIMES DAILY PRN    Calcium Carb-Cholecalciferol 600-500 MG-UNIT CAPS Oral    docusate sodium (COLACE) 100 mg, Oral, 2 TIMES DAILY    fluticasone-salmeterol (ADVAIR) 250-50 MCG/DOSE AEPB 1 puff, Inhalation, 2 TIMES DAILY    folic acid (FOLVITE) 6,641 mcg, Oral, DAILY    hydroCHLOROthiazide (MICROZIDE) 12.5 mg, Oral, DAILY    methotrexate (RHEUMATREX) 17.5 mg, Oral, WEEKLY, Take 7 tablets by mouth once a week.  predniSONE (DELTASONE) 2.5 mg, Oral, EVERY OTHER DAY    vitamin D (CHOLECALCIFEROL) 2,000 Units, Oral, DAILY       Vitals:    05/26/22 0955   Weight: 155 lb (70.3 kg)   Height: 5' 3\" (1.6 m)       Physical Exam:  Body mass index is 27.46 kg/m². NAD, here with daughter, pleasant  Left shoulder - skin clear   No swelling or ecchymosis identified   No TTP proximal humerus, clavicle, scapula   Active FE/scaption 90    External rotation 40, negative lag sign    Internal rotation LS junction   She can reach her mouth, top of the head, behind her head, and behind without issue  LUE SILT M/U/R/A/LABC nerve distributions; AIN/PIN/IO intact   Rad pulse intact      Imaging:  Images were personally reviewed by myself and discussed with the patient  Left shoulder 4 views performed 5/26/22 - comminuted proximal humerus fracture through the surgical neck is again seen. Overall stable anteromedial displacement of the humeral shaft with reamining good bony apposition of the main fracture fragments. The glenohumeral joint remains reduced on orthogonal imaging. Compared to prior radiographs, the fracture alignment is grossly unchanged. Fracture callus is seen, mainly posteriorly. Assessment & Plan:  80 y.o. female following up for   Diagnosis Orders   1. Traumatic closed displaced fracture of proximal end of left humerus, with routine healing, subsequent encounter  XR SHOULDER LEFT (MIN 2 VIEWS)       No orders of the defined types were placed in this encounter.       Reassurance given today  Radiographs stable  ROM stable  Improvement of symptoms since reinjury 4 days ago    Continue left upper extremity/shoulder activities as tolerated  If symptoms persist over the next couple weeks, I advised her daughter to call the office and I can order a CT scan if needed    Alexis Alvarez MD

## 2022-10-06 ENCOUNTER — OFFICE VISIT (OUTPATIENT)
Dept: ORTHOPEDIC SURGERY | Age: 87
End: 2022-10-06
Payer: MEDICARE

## 2022-10-06 VITALS — HEIGHT: 62 IN | WEIGHT: 168 LBS | BODY MASS INDEX: 30.91 KG/M2 | RESPIRATION RATE: 16 BRPM

## 2022-10-06 DIAGNOSIS — S42.202D TRAUMATIC CLOSED DISPLACED FRACTURE OF PROXIMAL END OF LEFT HUMERUS, WITH ROUTINE HEALING, SUBSEQUENT ENCOUNTER: Primary | ICD-10-CM

## 2022-10-06 PROCEDURE — 99213 OFFICE O/P EST LOW 20 MIN: CPT | Performed by: ORTHOPAEDIC SURGERY

## 2022-10-06 PROCEDURE — G8427 DOCREV CUR MEDS BY ELIG CLIN: HCPCS | Performed by: ORTHOPAEDIC SURGERY

## 2022-10-06 PROCEDURE — 1090F PRES/ABSN URINE INCON ASSESS: CPT | Performed by: ORTHOPAEDIC SURGERY

## 2022-10-06 PROCEDURE — 1036F TOBACCO NON-USER: CPT | Performed by: ORTHOPAEDIC SURGERY

## 2022-10-06 PROCEDURE — 1123F ACP DISCUSS/DSCN MKR DOCD: CPT | Performed by: ORTHOPAEDIC SURGERY

## 2022-10-06 PROCEDURE — G8417 CALC BMI ABV UP PARAM F/U: HCPCS | Performed by: ORTHOPAEDIC SURGERY

## 2022-10-06 PROCEDURE — G8484 FLU IMMUNIZE NO ADMIN: HCPCS | Performed by: ORTHOPAEDIC SURGERY

## 2022-10-06 NOTE — PROGRESS NOTES
ORTHOPAEDIC PROGRESS NOTE    Chief Complaint   Patient presents with    Follow-up     Left arm follow up        HPI   10/6/22  Sonja Rodríguez returns to clinic today for follow-up of her left proximal humerus fracture  She reports her left shoulder/arm is doing much better  Pain is rated 1 out of 10 only  She reports she can only lift so far, does not have much strength      5/26/22  Sonja Rodríguez returns to clinic today with her daughter due to reinjuring her left shoulder/arm  She was getting out of the car on May 27 when she hit her left posterior shoulder/arm on the center console  She had some increasing pain after that injury with more pain with range of motion as well  Therefore, she returns to clinic today for checkup  The daughter reports since this injury 4 days ago, the symptoms have improved      5/19/22  Follow-up left proximal humerus fracture  She is 4 months post injury  She reports her left shoulder is doing well  Pain is rated 0 out of 10 currently  She is advancing her activities with the left arm without issue  Including using a walker and weightbearing through it      4/7/22  Just short of 3 months post-injury  She reports her left shoulder is improving  She denies pain today   Doing her home exercises, range of motion  Home therapy is no longer coming    3/3/22  Follow-up left proximal humerus fracture  She is approximately 6 weeks post injury  No major interim issues    2/14/22  FU left proximal humerus fracture  No major issues  Using sling, doing HEP  Pain rated 4/10    1/31/22   Follow-up inpatient consult for left proximal humerus fracture  She is at home now  Using her sling and doing exercises as directed  Pain is improved, rated 3-4 out of 10  Swelling and bruising migrating distally  No numbness or tingling    1/17/22  The patient is a 80 y.o. female who presents with left shoulder pain after a fall in her home today. She has poor recall of events. Daughter at bedside.  . Pain is described in left shoulder and with the intensity of moderate. Pain is described as aching. Discomfort is persistent. She is alert and oriented. NO other complaints. Past Medical History:   Diagnosis Date    Arm fracture     left    Hypertension     Interstitial pulmonary disease (HCC)     Pulmonary fibrosis (HCC)     Rheumatoid arteritis ([address]     Vitamin D deficiency        Past Surgical History:   Procedure Laterality Date    CHOLECYSTECTOMY      WRIST SURGERY Right     with plates       Allergies   Allergen Reactions    Hydrogen Peroxide Itching    Lisinopril Other (See Comments)     Other reaction(s): Cough      Neomycin-Bacitracin Zn-Polymyx Itching       Current Outpatient Medications   Medication Instructions    acetaminophen (TYLENOL) 650 mg, Oral, DAILY    albuterol sulfate (PROAIR RESPICLICK) 064 (90 Base) MCG/ACT aerosol powder inhalation 2 puffs, Inhalation, 4 TIMES DAILY PRN    Calcium Carb-Cholecalciferol 600-500 MG-UNIT CAPS Oral    docusate sodium (COLACE) 100 mg, Oral, 2 TIMES DAILY    ferrous sulfate (IRON 325) 325 mg, Oral, DAILY WITH BREAKFAST    fluticasone-salmeterol (ADVAIR) 250-50 MCG/DOSE AEPB 1 puff, Inhalation, 2 TIMES DAILY    folic acid (FOLVITE) 7,130 mcg, Oral, DAILY    hydroCHLOROthiazide (MICROZIDE) 12.5 mg, Oral, DAILY    methotrexate (RHEUMATREX) 17.5 mg, Oral, WEEKLY, Take 7 tablets by mouth once a week. On Mondays    Multiple Vitamins-Minerals (OCUVITE PO) 1 tablet, Oral, DAILY    Multiple Vitamins-Minerals (THERAPEUTIC MULTIVITAMIN-MINERALS) tablet 1 tablet, Oral, DAILY    predniSONE (DELTASONE) 2.5 mg, Oral, EVERY OTHER DAY    vitamin C (ASCORBIC ACID) 500 mg, Oral, DAILY    vitamin D (CHOLECALCIFEROL) 2,000 Units, Oral, DAILY    zinc gluconate 50 mg, Oral, DAILY       Vitals:    10/06/22 1410   Resp: 16   Weight: 168 lb (76.2 kg)   Height: 5' 2\" (1.575 m)       Physical Exam:  Body mass index is 30.73 kg/m².   NAD, here with daughter, pleasant  Left shoulder - skin clear   No swelling or ecchymosis identified   No TTP proximal humerus, clavicle, scapula   Active FE/scaption 90    External rotation 40, negative ER lag sign    Internal rotation L4   She can reach her mouth, top of the head, behind her head, and behind without issue  CINTHIA MCCOY M/U/R/A/LABC nerve distributions; AIN/PIN/IO intact   Rad pulse intact      Imaging:  Images were personally reviewed by myself and discussed with the patient  Left shoulder 4 views performed 10/6/22 - comminuted proximal humerus fracture through the surgical neck is again seen. Overall stable anteromedial displacement of the humeral shaft with remaining good bony apposition of the main fracture fragments. The glenohumeral joint remains reduced on orthogonal imaging. Compared to prior radiographs, the fracture alignment is unchanged. Fracture healing is seen. Assessment & Plan:  80 y.o. female following up for   Diagnosis Orders   1. Traumatic closed displaced fracture of proximal end of left humerus, with routine healing, subsequent encounter  XR SHOULDER LEFT (MIN 2 VIEWS)          No orders of the defined types were placed in this encounter.       Her left shoulder is doing well  X-rays show healing  Shoulder exam is stable  I informed her she is unlikely to regain full range of motion or strength, and this is OK  Activity modification discussed  Avoid heavy lifting  Otherwise, activities as tolerated    Follow-up with any issues      Thor Davis MD

## 2022-10-09 ENCOUNTER — HOSPITAL ENCOUNTER (INPATIENT)
Age: 87
LOS: 5 days | Discharge: HOME | DRG: 871 | End: 2022-10-14
Attending: HOSPITALIST | Admitting: HOSPITALIST
Payer: MEDICARE

## 2022-10-09 ENCOUNTER — APPOINTMENT (OUTPATIENT)
Dept: GENERAL RADIOLOGY | Age: 87
DRG: 871 | End: 2022-10-09
Payer: MEDICARE

## 2022-10-09 DIAGNOSIS — J96.01 ACUTE RESPIRATORY FAILURE WITH HYPOXIA (HCC): Primary | ICD-10-CM

## 2022-10-09 DIAGNOSIS — A41.9 SEVERE SEPSIS (HCC): ICD-10-CM

## 2022-10-09 DIAGNOSIS — J84.10 PULMONARY FIBROSIS (HCC): ICD-10-CM

## 2022-10-09 DIAGNOSIS — R65.20 SEVERE SEPSIS (HCC): ICD-10-CM

## 2022-10-09 DIAGNOSIS — J18.9 PNEUMONIA OF BOTH LOWER LOBES DUE TO INFECTIOUS ORGANISM: ICD-10-CM

## 2022-10-09 PROBLEM — J96.00 ACUTE RESPIRATORY FAILURE (HCC): Status: ACTIVE | Noted: 2022-10-09

## 2022-10-09 LAB
A/G RATIO: 1.1 (ref 1.1–2.2)
ALBUMIN SERPL-MCNC: 3.5 G/DL (ref 3.4–5)
ALP BLD-CCNC: 99 U/L (ref 40–129)
ALT SERPL-CCNC: 14 U/L (ref 10–40)
ANION GAP SERPL CALCULATED.3IONS-SCNC: 14 MMOL/L (ref 3–16)
AST SERPL-CCNC: 23 U/L (ref 15–37)
BILIRUB SERPL-MCNC: 0.6 MG/DL (ref 0–1)
BUN BLDV-MCNC: 21 MG/DL (ref 7–20)
CALCIUM SERPL-MCNC: 9 MG/DL (ref 8.3–10.6)
CHLORIDE BLD-SCNC: 91 MMOL/L (ref 99–110)
CO2: 24 MMOL/L (ref 21–32)
CREAT SERPL-MCNC: 0.8 MG/DL (ref 0.6–1.2)
GFR AFRICAN AMERICAN: >60
GFR NON-AFRICAN AMERICAN: >60
GLUCOSE BLD-MCNC: 140 MG/DL (ref 70–99)
LACTIC ACID, SEPSIS: 2 MMOL/L (ref 0.4–1.9)
POTASSIUM SERPL-SCNC: 3.8 MMOL/L (ref 3.5–5.1)
RAPID INFLUENZA  B AGN: NEGATIVE
RAPID INFLUENZA A AGN: NEGATIVE
SARS-COV-2, NAAT: NOT DETECTED
SODIUM BLD-SCNC: 129 MMOL/L (ref 136–145)
TOTAL PROTEIN: 6.8 G/DL (ref 6.4–8.2)

## 2022-10-09 PROCEDURE — 36415 COLL VENOUS BLD VENIPUNCTURE: CPT

## 2022-10-09 PROCEDURE — 2700000000 HC OXYGEN THERAPY PER DAY

## 2022-10-09 PROCEDURE — 71046 X-RAY EXAM CHEST 2 VIEWS: CPT

## 2022-10-09 PROCEDURE — 87040 BLOOD CULTURE FOR BACTERIA: CPT

## 2022-10-09 PROCEDURE — 87635 SARS-COV-2 COVID-19 AMP PRB: CPT

## 2022-10-09 PROCEDURE — 94761 N-INVAS EAR/PLS OXIMETRY MLT: CPT

## 2022-10-09 PROCEDURE — 83605 ASSAY OF LACTIC ACID: CPT

## 2022-10-09 PROCEDURE — 6360000002 HC RX W HCPCS: Performed by: HOSPITALIST

## 2022-10-09 PROCEDURE — 87804 INFLUENZA ASSAY W/OPTIC: CPT

## 2022-10-09 PROCEDURE — 6370000000 HC RX 637 (ALT 250 FOR IP): Performed by: HOSPITALIST

## 2022-10-09 PROCEDURE — 6370000000 HC RX 637 (ALT 250 FOR IP): Performed by: PHYSICIAN ASSISTANT

## 2022-10-09 PROCEDURE — 99285 EMERGENCY DEPT VISIT HI MDM: CPT

## 2022-10-09 PROCEDURE — 6360000002 HC RX W HCPCS: Performed by: PHYSICIAN ASSISTANT

## 2022-10-09 PROCEDURE — 2060000000 HC ICU INTERMEDIATE R&B

## 2022-10-09 PROCEDURE — 96365 THER/PROPH/DIAG IV INF INIT: CPT

## 2022-10-09 PROCEDURE — 2580000003 HC RX 258: Performed by: PHYSICIAN ASSISTANT

## 2022-10-09 PROCEDURE — 85025 COMPLETE CBC W/AUTO DIFF WBC: CPT

## 2022-10-09 PROCEDURE — 94640 AIRWAY INHALATION TREATMENT: CPT

## 2022-10-09 PROCEDURE — 80053 COMPREHEN METABOLIC PANEL: CPT

## 2022-10-09 PROCEDURE — 2580000003 HC RX 258: Performed by: HOSPITALIST

## 2022-10-09 RX ORDER — ENOXAPARIN SODIUM 100 MG/ML
40 INJECTION SUBCUTANEOUS DAILY
Status: DISCONTINUED | OUTPATIENT
Start: 2022-10-10 | End: 2022-10-14 | Stop reason: HOSPADM

## 2022-10-09 RX ORDER — FOLIC ACID 1 MG/1
1000 TABLET ORAL DAILY
Status: DISCONTINUED | OUTPATIENT
Start: 2022-10-10 | End: 2022-10-14 | Stop reason: HOSPADM

## 2022-10-09 RX ORDER — ALBUTEROL SULFATE 90 UG/1
2 AEROSOL, METERED RESPIRATORY (INHALATION)
Status: COMPLETED | OUTPATIENT
Start: 2022-10-09 | End: 2022-10-09

## 2022-10-09 RX ORDER — PREDNISONE 20 MG/1
20 TABLET ORAL DAILY
Status: DISCONTINUED | OUTPATIENT
Start: 2022-10-09 | End: 2022-10-10

## 2022-10-09 RX ORDER — VITAMIN B COMPLEX
2000 TABLET ORAL DAILY
Status: DISCONTINUED | OUTPATIENT
Start: 2022-10-10 | End: 2022-10-14 | Stop reason: HOSPADM

## 2022-10-09 RX ORDER — ALBUTEROL SULFATE 2.5 MG/3ML
2.5 SOLUTION RESPIRATORY (INHALATION)
Status: DISCONTINUED | OUTPATIENT
Start: 2022-10-09 | End: 2022-10-14 | Stop reason: HOSPADM

## 2022-10-09 RX ORDER — ZINC SULFATE 50(220)MG
50 CAPSULE ORAL DAILY
Status: DISCONTINUED | OUTPATIENT
Start: 2022-10-10 | End: 2022-10-14 | Stop reason: HOSPADM

## 2022-10-09 RX ORDER — POLYETHYLENE GLYCOL 3350 17 G/17G
17 POWDER, FOR SOLUTION ORAL DAILY PRN
Status: DISCONTINUED | OUTPATIENT
Start: 2022-10-09 | End: 2022-10-14 | Stop reason: HOSPADM

## 2022-10-09 RX ORDER — AZITHROMYCIN 500 MG/1
500 TABLET, FILM COATED ORAL EVERY 24 HOURS
Status: CANCELLED | OUTPATIENT
Start: 2022-10-09 | End: 2022-10-12

## 2022-10-09 RX ORDER — ACETAMINOPHEN 650 MG/1
650 SUPPOSITORY RECTAL EVERY 6 HOURS PRN
Status: DISCONTINUED | OUTPATIENT
Start: 2022-10-09 | End: 2022-10-14 | Stop reason: HOSPADM

## 2022-10-09 RX ORDER — VANCOMYCIN HYDROCHLORIDE 125 MG/1
125 CAPSULE ORAL EVERY 6 HOURS SCHEDULED
Status: DISCONTINUED | OUTPATIENT
Start: 2022-10-09 | End: 2022-10-09 | Stop reason: CLARIF

## 2022-10-09 RX ORDER — ONDANSETRON 4 MG/1
4 TABLET, ORALLY DISINTEGRATING ORAL EVERY 8 HOURS PRN
Status: DISCONTINUED | OUTPATIENT
Start: 2022-10-09 | End: 2022-10-14 | Stop reason: HOSPADM

## 2022-10-09 RX ORDER — 0.9 % SODIUM CHLORIDE 0.9 %
2300 INTRAVENOUS SOLUTION INTRAVENOUS ONCE
Status: DISCONTINUED | OUTPATIENT
Start: 2022-10-09 | End: 2022-10-14 | Stop reason: HOSPADM

## 2022-10-09 RX ORDER — SODIUM CHLORIDE 0.9 % (FLUSH) 0.9 %
10 SYRINGE (ML) INJECTION EVERY 12 HOURS SCHEDULED
Status: DISCONTINUED | OUTPATIENT
Start: 2022-10-09 | End: 2022-10-14 | Stop reason: HOSPADM

## 2022-10-09 RX ORDER — ONDANSETRON 2 MG/ML
4 INJECTION INTRAMUSCULAR; INTRAVENOUS EVERY 6 HOURS PRN
Status: DISCONTINUED | OUTPATIENT
Start: 2022-10-09 | End: 2022-10-14 | Stop reason: HOSPADM

## 2022-10-09 RX ORDER — ASCORBIC ACID 500 MG
500 TABLET ORAL DAILY
Status: DISCONTINUED | OUTPATIENT
Start: 2022-10-10 | End: 2022-10-14 | Stop reason: HOSPADM

## 2022-10-09 RX ORDER — LEVOFLOXACIN 5 MG/ML
750 INJECTION, SOLUTION INTRAVENOUS ONCE
Status: DISCONTINUED | OUTPATIENT
Start: 2022-10-09 | End: 2022-10-09

## 2022-10-09 RX ORDER — SENNOSIDES 8.6 MG
650 CAPSULE ORAL DAILY
Status: DISCONTINUED | OUTPATIENT
Start: 2022-10-09 | End: 2022-10-09 | Stop reason: ALTCHOICE

## 2022-10-09 RX ORDER — ACETAMINOPHEN 325 MG/1
650 TABLET ORAL EVERY 6 HOURS PRN
Status: DISCONTINUED | OUTPATIENT
Start: 2022-10-09 | End: 2022-10-14 | Stop reason: HOSPADM

## 2022-10-09 RX ORDER — SODIUM CHLORIDE 0.9 % (FLUSH) 0.9 %
10 SYRINGE (ML) INJECTION PRN
Status: DISCONTINUED | OUTPATIENT
Start: 2022-10-09 | End: 2022-10-14 | Stop reason: HOSPADM

## 2022-10-09 RX ORDER — FERROUS SULFATE TAB EC 324 MG (65 MG FE EQUIVALENT) 324 (65 FE) MG
324 TABLET DELAYED RESPONSE ORAL
Status: DISCONTINUED | OUTPATIENT
Start: 2022-10-10 | End: 2022-10-14 | Stop reason: HOSPADM

## 2022-10-09 RX ORDER — 0.9 % SODIUM CHLORIDE 0.9 %
1000 INTRAVENOUS SOLUTION INTRAVENOUS ONCE
Status: DISCONTINUED | OUTPATIENT
Start: 2022-10-09 | End: 2022-10-09

## 2022-10-09 RX ORDER — SODIUM CHLORIDE 9 MG/ML
INJECTION, SOLUTION INTRAVENOUS PRN
Status: DISCONTINUED | OUTPATIENT
Start: 2022-10-09 | End: 2022-10-14 | Stop reason: HOSPADM

## 2022-10-09 RX ORDER — DOCUSATE SODIUM 100 MG/1
100 CAPSULE, LIQUID FILLED ORAL 2 TIMES DAILY
COMMUNITY

## 2022-10-09 RX ORDER — SODIUM CHLORIDE 9 MG/ML
INJECTION, SOLUTION INTRAVENOUS CONTINUOUS
Status: DISCONTINUED | OUTPATIENT
Start: 2022-10-09 | End: 2022-10-11

## 2022-10-09 RX ORDER — CETIRIZINE HYDROCHLORIDE 10 MG/1
10 TABLET ORAL EVERY EVENING
COMMUNITY

## 2022-10-09 RX ADMIN — SODIUM CHLORIDE: 9 INJECTION, SOLUTION INTRAVENOUS at 18:44

## 2022-10-09 RX ADMIN — CEFEPIME 2000 MG: 2 INJECTION, POWDER, FOR SOLUTION INTRAVENOUS at 15:44

## 2022-10-09 RX ADMIN — ALBUTEROL SULFATE 2 PUFF: 90 AEROSOL, METERED RESPIRATORY (INHALATION) at 14:37

## 2022-10-09 RX ADMIN — Medication 1250 MG: at 20:22

## 2022-10-09 RX ADMIN — PREDNISONE 20 MG: 20 TABLET ORAL at 18:51

## 2022-10-09 ASSESSMENT — PAIN - FUNCTIONAL ASSESSMENT: PAIN_FUNCTIONAL_ASSESSMENT: NONE - DENIES PAIN

## 2022-10-09 ASSESSMENT — LIFESTYLE VARIABLES
HOW MANY STANDARD DRINKS CONTAINING ALCOHOL DO YOU HAVE ON A TYPICAL DAY: 1 OR 2
HOW OFTEN DO YOU HAVE A DRINK CONTAINING ALCOHOL: MONTHLY OR LESS

## 2022-10-09 NOTE — PROGRESS NOTES
Pharmacy Medication Reconciliation Note     List of medications Cristofer Bhatt is currently taking is complete. Source of information:   1. Conversation with patient and family at bedside  2. EMR    Notes regarding home medications:   1. Patient reports taking only AM meds PTA in the ED  2.  Prednisone is 2.5 mg QOD and methotrexate was updated to #10 tabs Q week on mondays     Patient denies taking any additional OTC or herbal medications    Flaco Benitez, Pharmacy Intern  10/9/2022  4:53 PM

## 2022-10-09 NOTE — PLAN OF CARE
Problem: Discharge Planning  Goal: Discharge to home or other facility with appropriate resources  Outcome: Progressing  Flowsheets (Taken 10/9/2022 1734)  Discharge to home or other facility with appropriate resources:   Identify barriers to discharge with patient and caregiver   Identify discharge learning needs (meds, wound care, etc)   Refer to discharge planning if patient needs post-hospital services based on physician order or complex needs related to functional status, cognitive ability or social support system   Arrange for needed discharge resources and transportation as appropriate     Problem: Safety - Adult  Goal: Free from fall injury  Outcome: Progressing     Problem: ABCDS Injury Assessment  Goal: Absence of physical injury  Outcome: Progressing

## 2022-10-09 NOTE — CONSULTS
Clinical Pharmacy Note  Vancomycin Consult    Vadim Canchola is a 80 y.o. female ordered Vancomycin for CAP and sepsis; consult received from Dr. La Higgins to manage therapy. Also receiving cefepime. Allergies:  Hydrogen peroxide, Lisinopril, and Neomycin-bacitracin zn-polymyx     Temp max:  Temp (24hrs), Av °F (36.7 °C), Min:98 °F (36.7 °C), Max:98 °F (36.7 °C)      Recent Labs     10/09/22  1431   WBC 13.7*       Recent Labs     10/09/22  1431   BUN 21*   CREATININE 0.8       No intake or output data in the 24 hours ending 10/09/22 1919    Culture Results:  pending    Ht Readings from Last 1 Encounters:   10/09/22 5' 2\" (1.575 m)        Wt Readings from Last 1 Encounters:   10/06/22 168 lb (76.2 kg)         Estimated Creatinine Clearance: 46 mL/min (based on SCr of 0.8 mg/dL). Assessment/Plan:  Day # 1 of vancomycin. Vancomycin 1250 mg IV every 24 hours ordered. Goal -600  Predicted       Thank you for the consult.

## 2022-10-09 NOTE — ED PROVIDER NOTES
[address]        Pt Name: Román Marshall  MRN: [id number]  [place] 3/2/1933  Date of evaluation: 10/9/2022  Provider: Annabel Nicole PA-C  PCP: Ramiro Marquez  Note Started: 3:46 PM EDT      WESLEY. I have evaluated this patient. My supervising physician was available for consultation. Triage CHIEF COMPLAINT       Chief Complaint   Patient presents with    Cough     Pt daughter states that she has been coughing and feeling fatigued for the past few days . Pt had a fever  of 101.3 this morning and was given tylenol         HISTORY OF PRESENT ILLNESS   (Location/Symptom, Timing/Onset, Context/Setting, Quality, Duration, Modifying Factors, Severity)  Note limiting factors. Chief Complaint: Fevers, productive purulent cough, shortness of breath, decreased energy and strength for the last 2 and half days    Román Marshall is a 80 y.o. female who presents along with her daughter who gives majority of the history. Patient does have an ongoing chronic cough and has a history of interstitial lung disease as well as pulmonary fibrosis. However on Friday night she started to feel worse with increasing cough, the shortness of breath and decreased energy and strength yesterday and especially today. Fever this morning was 101.3 according to her daughter. She was given some Tylenol. She was brought in for further care and treatment. Patient does not use oxygen supplementation on a daily basis. She has been wiped out and barely had the energy to stand this morning. No particular complaint of pain at this time. Nursing Notes were all reviewed and agreed with or any disagreements were addressed in the HPI. REVIEW OF SYSTEMS    (2-9 systems for level 4, 10 or more for level 5)     Review of Systems  Positive history as above with fevers but no known infectious disease exposure, no headache vision changes neck pain or stiffness. Positive for increased work of breathing and shortness of breath. No chest pain. No abdominal pain vomiting or diarrhea. No burning on urination or passing urine. No stool acute change. No extremity acute loss of sensation or movement. Positive for generalized weakness. PAST MEDICAL HISTORY     Past Medical History:   Diagnosis Date    Arm fracture     left    CKD (chronic kidney disease)     stage 3    Hypertension     Interstitial pulmonary disease (HCC)     Pulmonary fibrosis (HCC)     Rheumatoid arteritis ([facility] [address]     Vitamin D deficiency        SURGICAL HISTORY     Past Surgical History:   Procedure Laterality Date    CHOLECYSTECTOMY      WRIST SURGERY Right     with plates       CURRENTMEDICATIONS       Previous Medications    ACETAMINOPHEN (TYLENOL) 650 MG EXTENDED RELEASE TABLET    Take 650 mg by mouth daily    ALBUTEROL SULFATE (PROAIR RESPICLICK) 646 (90 BASE) MCG/ACT AEROSOL POWDER INHALATION    Inhale 2 puffs into the lungs 4 times daily as needed    CALCIUM CARB-CHOLECALCIFEROL 600-500 MG-UNIT CAPS    Take by mouth    FERROUS SULFATE (IRON 325) 325 (65 FE) MG TABLET    Take 325 mg by mouth daily (with breakfast)    FLUTICASONE-SALMETEROL (ADVAIR) 250-50 MCG/DOSE AEPB    Inhale 1 puff into the lungs 2 times daily    FOLIC ACID (FOLVITE) 1 MG TABLET    Take 1,000 mcg by mouth daily    HYDROCHLOROTHIAZIDE (MICROZIDE) 12.5 MG CAPSULE    Take 12.5 mg by mouth daily    METHOTREXATE (RHEUMATREX) 2.5 MG CHEMO TABLET    Take 17.5 mg by mouth once a week Take 7 tablets by mouth once a week.  On Mondays    MULTIPLE VITAMINS-MINERALS (OCUVITE PO)    Take 1 tablet by mouth daily    MULTIPLE VITAMINS-MINERALS (THERAPEUTIC MULTIVITAMIN-MINERALS) TABLET    Take 1 tablet by mouth daily    PREDNISONE (DELTASONE) 2.5 MG TABLET    Take 2.5 mg by mouth every other day    VITAMIN C (ASCORBIC ACID) 500 MG TABLET    Take 500 mg by mouth daily    VITAMIN D (CHOLECALCIFEROL) 25 MCG (1000 UT) TABS TABLET    Take 2,000 Units by mouth daily    ZINC GLUCONATE 50 MG TABLET    Take 50 mg by mouth daily       ALLERGIES     Hydrogen peroxide, Lisinopril, and Neomycin-bacitracin zn-polymyx    FAMILYHISTORY     History reviewed. No pertinent family history. SOCIAL HISTORY       Social History     Socioeconomic History    Marital status:      Spouse name: None    Number of children: None    Years of education: None    Highest education level: None   Tobacco Use    Smoking status: Former     Types: Cigarettes     Quit date:      Years since quittin.8    Smokeless tobacco: Never   Vaping Use    Vaping Use: Never used   Substance and Sexual Activity    Alcohol use: Not Currently    Drug use: Not Currently       SCREENINGS             PHYSICAL EXAM    (up to 7 for level 4, 8 or more for level 5)     ED Triage Vitals [10/09/22 1348]   BP Temp Temp Source Heart Rate Resp SpO2 Height Weight   (!) 130/55 98 °F (36.7 °C) Oral 91 29 92 % 5' 2\" (1.575 m) --       Physical Exam  Vitals and nursing note reviewed. Constitutional:       Appearance: Normal appearance. She is ill-appearing. She is not diaphoretic. HENT:      Head: Normocephalic and atraumatic. Right Ear: External ear normal.      Left Ear: External ear normal.      Nose: Nose normal.      Mouth/Throat:      Mouth: Mucous membranes are moist.      Pharynx: No posterior oropharyngeal erythema. Eyes:      General:         Right eye: No discharge. Left eye: No discharge. Conjunctiva/sclera: Conjunctivae normal.   Cardiovascular:      Rate and Rhythm: Normal rate and regular rhythm. Pulses: Normal pulses. Heart sounds: Normal heart sounds. No murmur heard. No gallop. Pulmonary:      Effort: Tachypnea present. Breath sounds: Examination of the right-middle field reveals wheezing. Examination of the left-middle field reveals wheezing. Examination of the right-lower field reveals rales. Examination of the left-lower field reveals rales. Decreased breath sounds, wheezing and rales present. No rhonchi. Abdominal:      General: There is no distension. Palpations: Abdomen is soft. Tenderness: There is no abdominal tenderness. There is no right CVA tenderness or left CVA tenderness. Musculoskeletal:         General: Normal range of motion. Cervical back: Normal range of motion and neck supple. Right lower leg: No edema. Left lower leg: Edema present. Skin:     General: Skin is warm and dry. Capillary Refill: Capillary refill takes less than 2 seconds. Neurological:      Mental Status: She is alert and oriented to person, place, and time. Mental status is at baseline. Sensory: No sensory deficit. Motor: Weakness present. Coordination: Coordination normal.   Psychiatric:         Mood and Affect: Mood normal.         Behavior: Behavior normal.       DIAGNOSTIC RESULTS   LABS:    Labs Reviewed   CBC WITH AUTO DIFFERENTIAL - Abnormal; Notable for the following components:       Result Value    WBC 13.7 (*)     RBC 3.57 (*)     Hemoglobin 11.5 (*)     Hematocrit 34.3 (*)     Neutrophils Absolute 10.7 (*)     Monocytes Absolute 1.9 (*)     Bands Relative 11 (*)     Anisocytosis Occasional (*)     All other components within normal limits   COMPREHENSIVE METABOLIC PANEL - Abnormal; Notable for the following components:    Sodium 129 (*)     Chloride 91 (*)     Glucose 140 (*)     BUN 21 (*)     All other components within normal limits   LACTATE, SEPSIS - Abnormal; Notable for the following components:    Lactic Acid, Sepsis 2.0 (*)     All other components within normal limits   RAPID INFLUENZA A/B ANTIGENS   COVID-19, RAPID   CULTURE, BLOOD 1   CULTURE, BLOOD 2   LACTATE, SEPSIS       When ordered, only abnormal lab results are displayed. All other labs were within normal range or not returned as of this dictation. EKG:  When ordered, EKG's are interpreted by the Emergency Department Physician in the absence of a cardiologist.  Please see their note for interpretation of EKG. RADIOLOGY:   Non-plain film images such as CT, Ultrasound and MRI are read by the radiologist. Plain radiographic images are visualized andpreliminarily interpreted by the  ED Provider with the below findings:        Interpretation perthe Radiologist below, if available at the time of this note:    XR CHEST (2 VW)   Final Result   New patchy opacities at the lung bases suspicious for pneumonia. Radiographic follow-up is recommended. Pulmonary fibrosis similar to the prior study. XR CHEST (2 VW)    Result Date: 10/9/2022  EXAMINATION: TWO XRAY VIEWS OF THE CHEST 10/9/2022 11:08 am COMPARISON: 07/24/2009 and 01/17/2022. HISTORY: ORDERING SYSTEM PROVIDED HISTORY: cough TECHNOLOGIST PROVIDED HISTORY: Reason for exam:->cough Reason for Exam: cough, congestion x 1 week; fever since today FINDINGS: Peripheral linearity throughout the lungs is again noted. There are new patchy opacities at the lung bases. The costophrenic angles are sharp. The cardiomediastinal silhouette and pulmonary vessels appear normal.     New patchy opacities at the lung bases suspicious for pneumonia. Radiographic follow-up is recommended. Pulmonary fibrosis similar to the prior study. PROCEDURES   Unless otherwise noted below, none     Procedures    CRITICAL CARE TIME   I personally saw the patient and independently provided greater than 30 minutes of nonconcurrent critical care out of the total shared critical care time provided. This was provided for acute respiratory failure with hypoxia from bilateral lower lobe pneumonia with severe sepsis requiring oxygen supplementation, aggressive antibiotics and fluids as well as admission for further care and treatment.     CONSULTS:  None      EMERGENCY DEPARTMENT COURSE and DIFFERENTIAL DIAGNOSIS/MDM:   Vitals:    Vitals:    10/09/22 1348 10/09/22 1444 10/09/22 1449   BP: (!) 130/55     Pulse: 91 86 83   Resp: 29 18 18   Temp: 98 °F (36.7 °C)     TempSrc: Oral     SpO2: 92% (!) 86% 94%   Height: 5' 2\" (1.575 m)         Patient was given thefollowing medications:  Medications   cefepime (MAXIPIME) 2000 mg IVPB minibag (2,000 mg IntraVENous New Bag 10/9/22 1544)     And   levoFLOXacin (LEVAQUIN) 750 MG/150ML infusion 750 mg (has no administration in time range)   0.9 % sodium chloride bolus (has no administration in time range)   albuterol sulfate HFA (PROVENTIL;VENTOLIN;PROAIR) 108 (90 Base) MCG/ACT inhaler 2 puff (2 puffs Inhalation Given 10/9/22 1437)         Is this patient to be included in the SEP-1 Core Measure due to severe sepsis or septic shock? Yes   SEP-1 CORE MEASURE DATA      Sepsis Criteria   Severe Sepsis Criteria   Septic Shock Criteria     Must be confirmed or suspected to move forward with diagnosis of sepsis. Must meet 2:    [x] Temperature > 100.9 F (38.3 C)        or < 96.8 F (36 C)  [] HR > 90  [] RR > 20  [x] WBC > 12 or < 4 or 10% bands      AND:      [x] Infection Confirmed or        Suspected. Must meet 1:    [x] Lactate > 2       or   [x] Signs of Organ Dysfunction:    - SBP < 90 or MAP < 65  - Altered mental status  - Creatinine > 2 or increased from      baseline  - Urine Output < 0.5 ml/kg/hr  - Bilirubin > 2  - INR > 1.5 (not anticoagulated)  - Platelets < 972,024  - Acute Respiratory Failure as     evidenced by new need for NIPPV     or mechanical ventilation      [] No criteria met for Severe Sepsis. Must meet 1:    [] Lactate > 4        or   [] SBP < 90 or MAP < 65 for at        least two readings in the first        hour after fluid bolus        administration      [] Vasopressors initiated (if hypotension persists after fluid resuscitation)        [x] No criteria met for Septic Shock.    Patient Vitals for the past 6 hrs:   BP Temp Pulse Resp SpO2 Height   10/09/22 1348 (!) 130/55 98 °F (36.7 °C) 91 29 92 % 5' 2\" (1.575 m)   10/09/22 1444 -- -- 86 18 (!) 86 % --   10/09/22 1449 -- -- 83 18 94 % --      Recent Labs     10/09/22  1431   WBC 13.7*   CREATININE 0.8   BILITOT 0.6            Time Severe Sepsis Identified: 3:51pm    Fluid Resuscitation Rational: at least 30mL/kg based on ideal body weight due to obesity defined as BMI >30 (patient's BMI is Body mass index is 30.73 kg/m². and IBW is Ideal body weight: 50.1 kg (110 lb 7.2 oz)Adjusted ideal body weight: 60.5 kg (133 lb 7.5 oz))      Repeat lactate level: ordered and pending at this time    Reassessment Exam:   Not applicable. Patient does not have septic shock. Patient presents about evaluation and treatment is begun here including IV fluids, O2 supplementation, chest x-ray labs and EKG ordered. Concern is primarily for potential acute respiratory infection such as pneumonia. Patient becomes hypoxic on room air early in her emergency department stay and 2 L O2 through the nasal cannula stabilizes this. She gets a couple of breathing treatments. Chest x-ray returned showing bilateral lower lobe pneumonia. COVID and flu come back negative. CBC shows elevated white cell count 13.7 thousand and 11% bands. CMP does show some mild dehydration which is addressed with IV fluids, with a total of at least 30 mL/kg given. Lactic acid elevated at 2.0. Second lactic acid is pending. Patient does have evidence of severe sepsis without septic shock. Aggressive IV antibiotics begun after blood cultures. Patient does require inpatient admission and hospitalist is consulted. She is in fair condition currently. I am the Primary Clinician of Record. FINAL IMPRESSION      1. Acute respiratory failure with hypoxia ([address]    2. Pneumonia of both lower lobes due to infectious organism    3. Severe sepsis ([facility] [address]          DISPOSITION/PLAN   DISPOSITION        PATIENT REFERREDTO:  No follow-up provider specified.     DISCHARGE MEDICATIONS:  New Prescriptions    No medications on file       DISCONTINUED MEDICATIONS:  Discontinued Medications    DOCUSATE SODIUM (COLACE) 100 MG CAPSULE    Take 1 capsule by mouth 2 times daily              (Please note that portions ofthis note were completed with a voice recognition program.  Efforts were made to edit the dictations but occasionally words are mis-transcribed.)    Teresa Cabrales PA-C (electronically signed)             Teresa Cabrales PA-C  10/09/22 [id number]

## 2022-10-10 LAB
ANION GAP SERPL CALCULATED.3IONS-SCNC: 15 MMOL/L (ref 3–16)
ANISOCYTOSIS: ABNORMAL
ATYPICAL LYMPHOCYTE RELATIVE PERCENT: 1 % (ref 0–6)
BANDED NEUTROPHILS RELATIVE PERCENT: 11 % (ref 0–7)
BASOPHILS ABSOLUTE: 0 K/UL (ref 0–0.2)
BASOPHILS RELATIVE PERCENT: 0 %
BUN BLDV-MCNC: 17 MG/DL (ref 7–20)
C-REACTIVE PROTEIN: 162.9 MG/L (ref 0–5.1)
CALCIUM SERPL-MCNC: 8.5 MG/DL (ref 8.3–10.6)
CHLORIDE BLD-SCNC: 97 MMOL/L (ref 99–110)
CO2: 23 MMOL/L (ref 21–32)
CREAT SERPL-MCNC: 0.8 MG/DL (ref 0.6–1.2)
EOSINOPHILS ABSOLUTE: 0.1 K/UL (ref 0–0.6)
EOSINOPHILS RELATIVE PERCENT: 1 %
GFR AFRICAN AMERICAN: >60
GFR NON-AFRICAN AMERICAN: >60
GLUCOSE BLD-MCNC: 164 MG/DL (ref 70–99)
HCT VFR BLD CALC: 34.3 % (ref 36–48)
HEMATOLOGY PATH CONSULT: NORMAL
HEMATOLOGY PATH CONSULT: YES
HEMOGLOBIN: 11.5 G/DL (ref 12–16)
L. PNEUMOPHILA SEROGP 1 UR AG: NORMAL
LYMPHOCYTES ABSOLUTE: 1 K/UL (ref 1–5.1)
LYMPHOCYTES RELATIVE PERCENT: 6 %
MCH RBC QN AUTO: 32.2 PG (ref 26–34)
MCHC RBC AUTO-ENTMCNC: 33.5 G/DL (ref 31–36)
MCV RBC AUTO: 96.1 FL (ref 80–100)
MONOCYTES ABSOLUTE: 1.9 K/UL (ref 0–1.3)
MONOCYTES RELATIVE PERCENT: 14 %
NEUTROPHILS ABSOLUTE: 10.7 K/UL (ref 1.7–7.7)
NEUTROPHILS RELATIVE PERCENT: 67 %
PDW BLD-RTO: 15.2 % (ref 12.4–15.4)
PLATELET # BLD: 235 K/UL (ref 135–450)
PLATELET SLIDE REVIEW: ADEQUATE
PMV BLD AUTO: 7 FL (ref 5–10.5)
POTASSIUM REFLEX MAGNESIUM: 4.2 MMOL/L (ref 3.5–5.1)
PROCALCITONIN: 0.17 NG/ML (ref 0–0.15)
RBC # BLD: 3.57 M/UL (ref 4–5.2)
SLIDE REVIEW: ABNORMAL
SODIUM BLD-SCNC: 135 MMOL/L (ref 136–145)
STREP PNEUMONIAE ANTIGEN, URINE: NORMAL
WBC # BLD: 13.7 K/UL (ref 4–11)

## 2022-10-10 PROCEDURE — 94640 AIRWAY INHALATION TREATMENT: CPT

## 2022-10-10 PROCEDURE — 6370000000 HC RX 637 (ALT 250 FOR IP): Performed by: HOSPITALIST

## 2022-10-10 PROCEDURE — 36415 COLL VENOUS BLD VENIPUNCTURE: CPT

## 2022-10-10 PROCEDURE — 80048 BASIC METABOLIC PNL TOTAL CA: CPT

## 2022-10-10 PROCEDURE — 99223 1ST HOSP IP/OBS HIGH 75: CPT | Performed by: INTERNAL MEDICINE

## 2022-10-10 PROCEDURE — 6360000002 HC RX W HCPCS: Performed by: HOSPITALIST

## 2022-10-10 PROCEDURE — 87449 NOS EACH ORGANISM AG IA: CPT

## 2022-10-10 PROCEDURE — 2060000000 HC ICU INTERMEDIATE R&B

## 2022-10-10 PROCEDURE — 86140 C-REACTIVE PROTEIN: CPT

## 2022-10-10 PROCEDURE — 2580000003 HC RX 258: Performed by: HOSPITALIST

## 2022-10-10 PROCEDURE — 2700000000 HC OXYGEN THERAPY PER DAY

## 2022-10-10 PROCEDURE — 84145 PROCALCITONIN (PCT): CPT

## 2022-10-10 PROCEDURE — 94761 N-INVAS EAR/PLS OXIMETRY MLT: CPT

## 2022-10-10 RX ORDER — PREDNISONE 20 MG/1
40 TABLET ORAL DAILY
Status: DISCONTINUED | OUTPATIENT
Start: 2022-10-11 | End: 2022-10-14

## 2022-10-10 RX ADMIN — Medication 1250 MG: at 20:41

## 2022-10-10 RX ADMIN — MOMETASONE FUROATE AND FORMOTEROL FUMARATE DIHYDRATE 2 PUFF: 200; 5 AEROSOL RESPIRATORY (INHALATION) at 08:23

## 2022-10-10 RX ADMIN — ALBUTEROL SULFATE 2.5 MG: 2.5 SOLUTION RESPIRATORY (INHALATION) at 11:46

## 2022-10-10 RX ADMIN — ALBUTEROL SULFATE 2.5 MG: 2.5 SOLUTION RESPIRATORY (INHALATION) at 20:18

## 2022-10-10 RX ADMIN — PREDNISONE 20 MG: 20 TABLET ORAL at 08:02

## 2022-10-10 RX ADMIN — ZINC SULFATE 220 MG (50 MG) CAPSULE 50 MG: CAPSULE at 08:02

## 2022-10-10 RX ADMIN — ALBUTEROL SULFATE 2.5 MG: 2.5 SOLUTION RESPIRATORY (INHALATION) at 08:23

## 2022-10-10 RX ADMIN — MOMETASONE FUROATE AND FORMOTEROL FUMARATE DIHYDRATE 2 PUFF: 200; 5 AEROSOL RESPIRATORY (INHALATION) at 20:18

## 2022-10-10 RX ADMIN — ENOXAPARIN SODIUM 40 MG: 100 INJECTION SUBCUTANEOUS at 08:01

## 2022-10-10 RX ADMIN — FOLIC ACID 1000 MCG: 1 TABLET ORAL at 08:02

## 2022-10-10 RX ADMIN — ALBUTEROL SULFATE 2.5 MG: 2.5 SOLUTION RESPIRATORY (INHALATION) at 16:00

## 2022-10-10 RX ADMIN — CEFEPIME 2000 MG: 2 INJECTION, POWDER, FOR SOLUTION INTRAVENOUS at 04:59

## 2022-10-10 RX ADMIN — Medication 2000 UNITS: at 08:02

## 2022-10-10 RX ADMIN — CEFEPIME 2000 MG: 2 INJECTION, POWDER, FOR SOLUTION INTRAVENOUS at 15:20

## 2022-10-10 RX ADMIN — FERROUS SULFATE TAB EC 324 MG (65 MG FE EQUIVALENT) 324 MG: 324 (65 FE) TABLET DELAYED RESPONSE at 08:02

## 2022-10-10 RX ADMIN — OXYCODONE HYDROCHLORIDE AND ACETAMINOPHEN 500 MG: 500 TABLET ORAL at 08:02

## 2022-10-10 ASSESSMENT — PAIN SCALES - GENERAL
PAINLEVEL_OUTOF10: 0
PAINLEVEL_OUTOF10: 0

## 2022-10-10 NOTE — PROGRESS NOTES
Hospitalist Progress Note  10/10/2022 10:45 AM    PCP: Kyle Garza    [id number]     Date of Admission: 10/9/2022                                                                                                                     HOSPITAL COURSE    Patient demographics:  The patient  Raf Graves is a 80 y.o. female     Significant past medical history:   Patient Active Problem List   Diagnosis    Closed fracture of proximal end of left humerus, initial encounter    Acute respiratory failure ([address]         Presenting symptoms:  Shortness of breath    Diagnostic workup:      CONSULTS DURING ADMISSION :   IP CONSULT TO PULMONOLOGY  IP CONSULT TO SOCIAL WORK  PHARMACY TO DOSE VANCOMYCIN      Patient was diagnosed with:  Pneumonia  Rheumatoid arthritis    Treatment while inpatient:  Patient presented to the emergency room cough fever production of sputum and shortness of breath.                                                                                       ----------------------------------------------------------      SUBJECTIVE COMPLAINTS- Shortness of breath    Diet: ADULT DIET;  Regular      OBJECTIVE:   Patient Active Problem List   Diagnosis    Closed fracture of proximal end of left humerus, initial encounter    Acute respiratory failure (HCC)       Allergies  Hydrogen peroxide, Lisinopril, and Neomycin-bacitracin zn-polymyx    Medications    Scheduled Meds:   [START ON 10/11/2022] predniSONE  40 mg Oral Daily    sodium chloride  2,300 mL IntraVENous Once    ferrous sulfate  324 mg Oral Daily with breakfast    mometasone-formoterol  2 puff Inhalation BID    folic acid  8,231 mcg Oral Daily    vitamin C  500 mg Oral Daily    Vitamin D  2,000 Units Oral Daily    zinc sulfate  50 mg Oral Daily    sodium chloride flush  10 mL IntraVENous 2 times per day    enoxaparin  40 mg SubCUTAneous Daily    albuterol  2.5 mg Nebulization Q4H WA    cefepime  2,000 mg IntraVENous Q12H    vancomycin  1,250 mg IntraVENous Q24H    vancomycin (VANCOCIN) intermittent dosing (placeholder)   Other RX Placeholder     Continuous Infusions:   sodium chloride 75 mL/hr at 10/10/22 0911    sodium chloride       PRN Meds:  sodium chloride flush, sodium chloride, ondansetron **OR** ondansetron, polyethylene glycol, acetaminophen **OR** acetaminophen    Vitals   Vitals /wt Patient Vitals for the past 8 hrs:   BP Temp Temp src Pulse Resp SpO2 Weight   10/10/22 0845 -- -- -- 77 27 93 % --   10/10/22 0824 -- -- -- -- -- 95 % --   10/10/22 0745 98/63 97.7 °F (36.5 °C) Oral 66 20 94 % --   10/10/22 0600 -- -- -- -- -- -- 169 lb 5 oz (76.8 kg)   10/10/22 0433 (!) 158/94 97.3 °F (36.3 °C) Axillary 79 22 93 % --        72HR INTAKE/OUTPUT:    Intake/Output Summary (Last 24 hours) at 10/10/2022 1045  Last data filed at 10/10/2022 0911  Gross per 24 hour   Intake 1381.62 ml   Output 400 ml   Net 981.62 ml       Exam:    Gen:   Alert and oriented ×3    Eyes: PERRL. No sclera icterus. No conjunctival injection. ENT: No discharge. Pharynx clear. External appearance of ears and nose normal.  Neck: Trachea midline. No obvious mass. Resp: Continues to have crackles and rhonchi but better air movement today than yesterday  CV: Regular rate. Regular rhythm. No murmur or rub. No edema. GI: Non-tender. Non-distended. No hernia. Skin: Warm, dry, normal texture and turgor. Lymph: No cervical LAD. No supraclavicular LAD. M/S: / Ext. No cyanosis. No clubbing. No joint deformity. Neuro: CN 2-12 are intact,  no neurologic deficits noted. PT/INR: No results for input(s): PROTIME, INR in the last 72 hours. APTT: No results for input(s): APTT in the last 72 hours.     CBC:   Recent Labs     10/09/22  1431   WBC 13.7*   HGB 11.5*   HCT 34.3*   MCV 96.1          BMP:   Recent Labs     10/09/22  1431 10/10/22  0533   * 135*   K 3.8 4.2   CL 91* 97*   CO2 24 23   BUN 21* 17   CREATININE 0.8 0.8       LIVER PROFILE:   Recent Labs 10/09/22  1431   ALKPHOS 99   AST 23   ALT 14   BILITOT 0.6     No results for input(s): AMYLASE in the last 72 hours. No results for input(s): LIPASE in the last 72 hours. UA:No results for input(s): NITRITE, LABCAST, WBCUA, RBCUA, MUCUS in the last 72 hours. TROPONIN: No results for input(s): Ghanshyam Rivera in the last 72 hours. Lab Results   Component Value Date/Time    URRFLXCULT Not Indicated 01/17/2022 01:58 PM       No results for input(s): TSHREFLEX in the last 72 hours. No components found for: VBY2512  POC GLUCOSE:  No results for input(s): POCGLU in the last 72 hours. No results for input(s): LABA1C in the last 72 hours. No results found for: LABA1C      ASSESSMENT AND PLAN     Pneumonia  In a patient with rheumatoid arthritis  Patient is on methotrexate and prednisone  Start patient on antibiotics with cefepime and vancomycin  Bronchodilators  Increase prednisone dose to 40 mg  Check MRSA DNA probe  Patient is feeling much better today  Pulmonary consult is appreciated     Rheumatoid arthritis  Increase prednisone dose  Plan is to keep methotrexate on hold         Code Status: DNR-CCA        Dispo - cc        The patient and / or the family were informed of the results of any tests, a time was given to answer questions, a plan was proposed and they agreed with plan. Raúl Quiroz MD    This note was transcribed using [id number] [employer]. Please disregard any translational errors.

## 2022-10-10 NOTE — H&P
Hospitalist  History and Physical    Patient:  My Lobato  MRN: [id number]  PCP: Cb Gutierrez    CHIEF COMPLAINT: Shortness of breath      HISTORY OF PRESENT ILLNESS:   The patient My Lobato is a 80 y. o.female with medical history significant for rheumatoid arthritis COPD pulmonary fibrosis interstitial lung disease hypertension. Patient presented to the emergency room cough fever production of sputum and shortness of breath. Patient is unable to provide any history patient's daughter is on the bedside who reported that patient has interstitial lung disease as her baseline most likely complication of rheumatoid arthritis. It has been getting worse for the last several days and patient spiked a fever up to 101.3. Patient has been very lethargic weak with lack of energy. Normally patient does not use oxygen at home. No complaint of chest pain no nausea vomiting or diarrhea no history of hematemesis or melena. Past Medical History:        Diagnosis Date    Arm fracture     left    CKD (chronic kidney disease)     stage 3    Hypertension     Interstitial pulmonary disease (HCC)     Pulmonary fibrosis (HCC)     Rheumatoid arteritis ([facility]     Vitamin D deficiency        Past Surgical History:        Procedure Laterality Date    CHOLECYSTECTOMY      WRIST SURGERY Right     with plates       Medications Prior to Admission:    Prior to Admission medications    Medication Sig Start Date End Date Taking?  Authorizing Provider   cetirizine (ZYRTEC) 10 MG tablet Take 10 mg by mouth every evening   Yes Historical Provider, MD   docusate sodium (COLACE) 100 MG capsule Take 100 mg by mouth 2 times daily   Yes Historical Provider, MD   Multiple Vitamins-Minerals (THERAPEUTIC MULTIVITAMIN-MINERALS) tablet Take 1 tablet by mouth daily    Historical Provider, MD   Multiple Vitamins-Minerals (OCUVITE PO) Take 1 tablet by mouth daily    Historical Provider, MD   ferrous sulfate (IRON 325) 325 (65 Fe) MG tablet Take 325 mg by mouth daily (with breakfast)    Historical Provider, MD   vitamin C (ASCORBIC ACID) 500 MG tablet Take 500 mg by mouth daily    Historical Provider, MD   fluticasone-salmeterol (ADVAIR) 250-50 MCG/DOSE AEPB Inhale 1 puff into the lungs 2 times daily 1/5/22   Historical Provider, MD   methotrexate (RHEUMATREX) 2.5 MG chemo tablet Take 25 mg by mouth once a week Take 10 tablets by mouth once a week. On Mondays 7/15/21   Historical Provider, MD   predniSONE (DELTASONE) 2.5 MG tablet Take 2.5 mg by mouth every other day 7/15/21   Historical Provider, MD   hydroCHLOROthiazide (MICROZIDE) 12.5 MG capsule Take 12.5 mg by mouth daily 1/6/22   Historical Provider, MD   folic acid (FOLVITE) 1 MG tablet Take 1,000 mcg by mouth daily 12/30/21   Historical Provider, MD   albuterol sulfate (PROAIR RESPICLICK) 259 (90 Base) MCG/ACT aerosol powder inhalation Inhale 2 puffs into the lungs 4 times daily as needed 12/18/21   Historical Provider, MD   acetaminophen (TYLENOL) 650 MG extended release tablet Take 1,300 mg by mouth every morning (before breakfast)    Historical Provider, MD   vitamin D (CHOLECALCIFEROL) 25 MCG (1000 UT) TABS tablet Take 2,000 Units by mouth daily    Historical Provider, MD   Calcium Carb-Cholecalciferol 500-125 MG-UNIT TABS Take 2 tablets by mouth every morning (before breakfast)    Historical Provider, MD       Allergies:  Hydrogen peroxide, Lisinopril, and Neomycin-bacitracin zn-polymyx      Social History:   TOBACCO:   reports that she quit smoking about 47 years ago. Her smoking use included cigarettes. She has never used smokeless tobacco.  ETOH:   reports that she does not currently use alcohol.         Family History:   Denies interstitial lung disease in the family        REVIEW OF SYSTEMS:     Unable to do review of systems due to patient's mental status      CONSTITUTIONAL:      fatigue, fever, chills or night sweats, recent weight gain, recent wt loss, insomnia,  General weakness, poor appetite, muscle aches and pains    HEAD: headache, dizziness    EYES:      blurriness,  double vision, dryness,  discharge, irritation,diplopia    EARS:      hearing loss, vertigo, ear discharge,  Earache. Ringing in the ears. NOSE:      Rhinorrhea, sneezing, epistaxis. Discharge, sinusitis,     MOUTH/THROAT:         sore throat, mouth ulcers, Hoarseness    RESPIRATORY:        Shortness of breath, wheezing,  cough, sputum, hemoptysis, obstructive sleep apnea,    CARDIOVASCULAR :      chest pain, palpitations, dyspnea on exercise, Lower extrimity edema (swelling),     GASTROINTESTINAL:       Dysphagia, Poor appetite,  Nausea, Vomiting, diarrhea, heartburn, abdominal pain. Blood in the stools, hematemesis. Pain with swallowing, constipation    GENITOURINARY:       Urinary frequency, hesitancy,  urgency, Dysuria, hematuria,  Urinary Incontinence. Urinary Retention. GYNECOLOGICAL: vaginal bleeding , vaginal discharge, menopause    MUSCULOSKELETAL:       joint swelling or stiffness, joint pain, muscle pain, balance problems, low back pain. NEUROLOGICAL:      Gait problems. Tremor. Dizziness. Pain and paresthesias, weakness in extremities. Seizures, memory loss    PSYCHLOGICAL:        Anxiety, depression    SKIN :      Rashes ulcers, skin color changes, easy bruisability, lymphadenopathy      Physical Exam:      Vitals: BP (!) 153/86   Pulse 83   Temp 99 °F (37.2 °C) (Axillary)   Resp 20   Ht 5' 2\" (1.575 m)   SpO2 93%   BMI 30.73 kg/m²     Gen:          Alert and oriented x 1  Eyes: PERRL. No sclera icterus. No conjunctival injection. ENT: No discharge. Pharynx clear. External appearance of ears and nose normal.  Neck: Trachea midline. No obvious mass. Resp: Bilateral crackles and rhonchi  CV: Regular rate. Regular rhythm. No murmur or rub. No edema. GI: Non-tender. Non-distended. No hernia. Skin: Warm, dry, normal texture and turgor. Lymph: No cervical LAD. No supraclavicular LAD. M/S: / Ext. No cyanosis. No clubbing. No joint deformity. Neuro: Moves all four extremities. CN 2-12 tested, no deficits noted. Peripheral pulses and capillary refill is intact. CBC:   Recent Labs     10/09/22  1431   WBC 13.7*   HGB 11.5*        BMP:    Recent Labs     10/09/22  1431   *   K 3.8   CL 91*   CO2 24   BUN 21*   CREATININE 0.8   GLUCOSE 140*     Hepatic:   Recent Labs     10/09/22  1431   AST 23   ALT 14   BILITOT 0.6   ALKPHOS 99     Troponin: No results for input(s): TROPONINI in the last 72 hours. BNP: No results for input(s): BNP in the last 72 hours. INR: No results for input(s): INR in the last 72 hours. No results found for: LABA1C        No results for input(s): CKTOTAL in the last 72 hours. -----------------------------------------------------------------    CXR. New patchy opacities at the lung bases suspicious for pneumonia  Pulmonary fibrosis as noted before      Assessment / Plan     Pneumonia  In a patient with rheumatoid arthritis  Patient is on methotrexate and prednisone  Start patient on antibiotics with cefepime and vancomycin  Bronchodilators  Increase prednisone dose to 20 mg  Check MRSA DNA probe  Consult to pulmonary    Rheumatoid arthritis  Increase prednisone dose  Hold methotrexate          DVT and GI prophylaxis      DNR-CCA      Rin Treviño MD M.D    This note was transcribed using [id number] [facility]. Please disregard any translational errors.

## 2022-10-10 NOTE — CONSULTS
Pulmonary Consult Note     Patient's name:  Ernesto Nichole  Medical Record Number: [id number]  Patient's account/billing number: [de-identified]  Patient's YOB: 1933  Age: 80 y.o. 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 Follow up culture results  Check CRF      HISTORY OF PRESENT ILLNESS:   Mr./Ms. Ernesto Nichole is a 80 y.o.  female with seropositive rheumatoid arthritis on combination therapy of methotrexate and low-dose prednisone. She is currently taking methotrexate 17.5 mg a week and prednisone 2.5 mg every other day. RA related UIP, not on home oxygen, presented with worsening sob, cough, sputum colored and fever, associated with weakness and decrease appetite. No chest pain  Some AMS  No recent change in meds. CXR with increase airspace disease  PCT slightly elevated  Leukocytosis  All serology and culture negative to date. Past Medical History:        Diagnosis Date    Arm fracture     left    CKD (chronic kidney disease)     stage 3    Hypertension     Interstitial pulmonary disease (HCC)     Pulmonary fibrosis (HCC)     Rheumatoid arteritis ([facility] [address]     Vitamin D deficiency        Past Surgical History:        Procedure Laterality Date    CHOLECYSTECTOMY      WRIST SURGERY Right     with plates       Allergies:     Allergies   Allergen Reactions    Hydrogen Peroxide Itching    Lisinopril Other (See Comments)     Other reaction(s): Cough      Neomycin-Bacitracin Zn-Polymyx Itching         Home Meds:   Prior to Admission medications    Medication Sig Start Date End Date Taking? Authorizing Provider   cetirizine (ZYRTEC) 10 MG tablet Take 10 mg by mouth every evening   Yes Historical Provider, MD   docusate sodium (COLACE) 100 MG capsule Take 100 mg by mouth 2 times daily   Yes Historical Provider, MD   Multiple Vitamins-Minerals (THERAPEUTIC MULTIVITAMIN-MINERALS) tablet Take 1 tablet by mouth daily    Historical Provider, MD   Multiple Vitamins-Minerals (OCUVITE PO) Take 1 tablet by mouth daily    Historical Provider, MD   ferrous sulfate (IRON 325) 325 (65 Fe) MG tablet Take 325 mg by mouth daily (with breakfast)    Historical Provider, MD   vitamin C (ASCORBIC ACID) 500 MG tablet Take 500 mg by mouth daily    Historical Provider, MD   fluticasone-salmeterol (ADVAIR) 250-50 MCG/DOSE AEPB Inhale 1 puff into the lungs 2 times daily 1/5/22   Historical Provider, MD   methotrexate (RHEUMATREX) 2.5 MG chemo tablet Take 25 mg by mouth once a week Take 10 tablets by mouth once a week. On Mondays 7/15/21   Historical Provider, MD   predniSONE (DELTASONE) 2.5 MG tablet Take 2.5 mg by mouth every other day 7/15/21   Historical Provider, MD   hydroCHLOROthiazide (MICROZIDE) 12.5 MG capsule Take 12.5 mg by mouth daily 1/6/22   Historical Provider, MD   folic acid (FOLVITE) 1 MG tablet Take 1,000 mcg by mouth daily 12/30/21   Historical Provider, MD   albuterol sulfate (PROAIR RESPICLICK) 995 (90 Base) MCG/ACT aerosol powder inhalation Inhale 2 puffs into the lungs 4 times daily as needed 12/18/21   Historical Provider, MD   acetaminophen (TYLENOL) 650 MG extended release tablet Take 1,300 mg by mouth every morning (before breakfast)    Historical Provider, MD   vitamin D (CHOLECALCIFEROL) 25 MCG (1000 UT) TABS tablet Take 2,000 Units by mouth daily    Historical Provider, MD   Calcium Carb-Cholecalciferol 500-125 MG-UNIT TABS Take 2 tablets by mouth every morning (before breakfast)    Historical Provider, MD       Family History:   History reviewed.  No pertinent family history. Social History:   TOBACCO:   reports that she quit smoking about 47 years ago. Her smoking use included cigarettes. She has never used smokeless tobacco.  ETOH:   reports that she does not currently use alcohol. DRUGS:  reports that she does not currently use drugs. REVIEW OF SYSTEMS:  Review of Systems -   General ROS: weakness, fever   Psychological ROS: negative  Ophthalmic ROS: negative  ENT ROS: negative  Allergy and Immunology ROS: negative  Hematological and Lymphatic ROS: negative  Endocrine ROS: negative  Breast ROS: negative  Respiratory ROS: cough, sputum and sob   Cardiovascular ROS: no chest pain or dyspnea on exertion  Gastrointestinal ROS:negative  Genito-Urinary ROS: negative  Musculoskeletal ROS: negative  Neurological ROS: negative  Dermatological ROS: negative        Physical Exam:    Vitals: BP 98/63   Pulse 66   Temp 97.7 °F (36.5 °C) (Oral)   Resp 20   Ht 5' 2\" (1.575 m)   Wt 169 lb 5 oz (76.8 kg)   SpO2 94%   BMI 30.97 kg/m²     Last Body weight:   Wt Readings from Last 3 Encounters:   10/10/22 169 lb 5 oz (76.8 kg)   10/06/22 168 lb (76.2 kg)   05/26/22 155 lb (70.3 kg)       Body Mass Index : Body mass index is 30.97 kg/m². Intake and Output summary:   Intake/Output Summary (Last 24 hours) at 10/10/2022 0825  Last data filed at 10/10/2022 0759  Gross per 24 hour   Intake 1278.85 ml   Output 400 ml   Net 878.85 ml       Physical Examination:     PHYSICAL EXAM:    Gen:  mild acute distress  Eyes: PERRL. Anicteric sclera. No conjunctival injection. ENT: No discharge. Posterior oropharynx clear. External appearance of ears and nose normal.  Neck: Trachea midline. No mass, no lymphadenopathy    Resp:  bilateral rales, and diminished with scattered wheezing   CV: Regular rate. Regular rhythm. No murmur or rub. No edema. GI: Soft, Non-tender. Non-distended. +BS  Skin: Warm, dry, w/o erythema. Lymph: No cervical or supraclavicular LAD. M/S: No cyanosis.  No clubbing. Neuro:  CN 2-12 tested, no focal neurologic deficit. Moves all extremities  Psych: Awake and alert, Oriented x 3. Judgement and insight appropriate. Mood stable. Laboratory findings:-    CBC:   Recent Labs     10/09/22  1431   WBC 13.7*   HGB 11.5*        BMP:    Recent Labs     10/09/22  1431 10/10/22  0533   * 135*   K 3.8 4.2   CL 91* 97*   CO2 24 23   BUN 21* 17   CREATININE 0.8 0.8   GLUCOSE 140* 164*     S. Calcium:  Recent Labs     10/10/22  0533   CALCIUM 8.5       Hepatic functions:   Recent Labs     10/09/22  1431   ALKPHOS 99   ALT 14   AST 23   PROT 6.8   BILITOT 0.6   LABALBU 3.5     Radiology Review:  Pertinent images / reports were reviewed as a part of this visit. CT Chest w/ contrast: No results found for this or any previous visit. CT Chest w/o contrast: No results found for this or any previous visit. CTPA: No results found for this or any previous visit. CXR PA/LAT: Results for orders placed during the hospital encounter of 10/09/22    XR CHEST (2 VW)    Narrative  EXAMINATION:  TWO XRAY VIEWS OF THE CHEST    10/9/2022 11:08 am    COMPARISON:  07/24/2009 and 01/17/2022. HISTORY:  ORDERING SYSTEM PROVIDED HISTORY: cough  TECHNOLOGIST PROVIDED HISTORY:  Reason for exam:->cough  Reason for Exam: cough, congestion x 1 week; fever since today    FINDINGS:  Peripheral linearity throughout the lungs is again noted. There are new  patchy opacities at the lung bases. The costophrenic angles are sharp. The  cardiomediastinal silhouette and pulmonary vessels appear normal.    Impression  New patchy opacities at the lung bases suspicious for pneumonia. Radiographic follow-up is recommended. Pulmonary fibrosis similar to the prior study.       CXR portable: Results for orders placed during the hospital encounter of 01/17/22    XR CHEST PORTABLE    Narrative  EXAMINATION:  ONE XRAY VIEW OF THE CHEST    1/17/2022 1:19 pm    COMPARISON:  2009    HISTORY:  ORDERING SYSTEM PROVIDED HISTORY: fall, AMS  TECHNOLOGIST PROVIDED HISTORY:  Reason for exam:->fall, AMS  Reason for Exam: fall, AMS    FINDINGS:  Mild cardiomegaly. Moderate interstitial fibrotic changes identified in the  lungs. The lung changes are mostly chronic. Bibasilar atelectasis. No  pneumothorax. Significant osteopenic changes and degenerative changes noted in the bony  structures. No displaced rib fractures. Impression  Moderate interstitial fibrotic changes in the lungs. No pneumothorax. No displaced rib fractures.                      Rosalind Phillips MD, M.D.            10/10/2022, 8:25 AM

## 2022-10-10 NOTE — ACP (ADVANCE CARE PLANNING)
Advance Care Planning     Advance Care Planning Activator (Inpatient)  Conversation Note      Date of ACP Conversation: 10/10/2022     Conversation Conducted with: Patient with [address]: Named in Advance Directive or Healthcare Power of  (name) Eagle Gill daughter     ACP Activator: BUTCH Fenton, MUW    Health Care Decision Maker:     Current Designated Health Care Decision Maker:     Primary Decision Maker: Mellisa Holguin 53 - [phone]    Secondary Decision Maker: Jude Veliz Child - [phone]  Today we documented Decision Maker(s) consistent with ACP documents on file. Care Preferences    Ventilation: \"If you were in your present state of health and suddenly became very ill and were unable to breathe on your own, what would your preference be about the use of a ventilator (breathing machine) if it were available to you? \"      Would the patient desire the use of ventilator (breathing machine)?: no    \"If your health worsens and it becomes clear that your chance of recovery is unlikely, what would your preference be about the use of a ventilator (breathing machine) if it were available to you? \"     Would the patient desire the use of ventilator (breathing machine)?: No      Resuscitation  \"CPR works best to restart the heart when there is a sudden event, like a heart attack, in someone who is otherwise healthy. Unfortunately, CPR does not typically restart the heart for people who have serious health conditions or who are very sick. \"    \"In the event your heart stopped as a result of an underlying serious health condition, would you want attempts to be made to restart your heart (answer \"yes\" for attempt to resuscitate) or would you prefer a natural death (answer \"no\" for do not attempt to resuscitate)? \" no       [] Yes   [x] No   Educated Patient / Decision Maker regarding differences between Advance Directives and portable DNR orders.     Length of ACP Conversation in minutes:  5    Conversation Outcomes:  [] ACP discussion completed  [x] Existing advance directive reviewed with patient; no changes to patient's previously recorded wishes  [] New Advance Directive completed  [] Portable Do Not Rescitate prepared for Provider review and signature  [] POLST/POST/MOLST/MOST prepared for Provider review and signature      Follow-up plan:    [] Schedule follow-up conversation to continue planning  [] Referred individual to Provider for additional questions/concerns   [] Advised patient/agent/surrogate to review completed ACP document and update if needed with changes in condition, patient preferences or care setting    [] This note routed to one or more involved healthcare providers     Electronically signed by BUTCH Lemus, MUW on 10/10/2022 at 2:44 PM

## 2022-10-10 NOTE — PROGRESS NOTES
Physician Progress Note      PATIENT:               Diana Velez  CSN #:                  [id number]  :                       3/2/1933  ADMIT DATE:       10/9/2022 1:35 PM  [address] DATE:  Papo Milligan  PROVIDER #:        Cody Anderson MD          QUERY TEXT:    Pt admitted with cough, fatigue and fever. Pt noted to have pneumonia. If   possible, please document in the progress notes and discharge summary if you   are evaluating and/or treating any of the following:      Note: CAP and HCAP indicate where the pneumonia was acquired, not a specific   type. The medical record reflects the following:  Risk Factors: Current admission for pneumonia. PMH- rheumatoid arthritis,   taking methotrexate and prednisone  Clinical Indicators: VS-99, 96, 31, 130/55   86% RA   94% 2L . .. wbc 13.7,   neut 10.7,  lactate 2.0, procal 0.17, MRSA-P. Schrader Potters [facility] CXR-New patchy opacities at the   lung bases suspicious for pneumonia. Treatment: IVF, Cefepime IV, Vancomycin IV, BC's, Resp cult, Pulm consult. Thank Juan Pederson RN BSN CDS [email or web address]  Options provided:  -- Treating for gram negative pneumonia  -- Treating for MRSA pneumonia  -- Treating for aspiration pneumonia  -- Other - I will add my own diagnosis  -- Disagree - Not applicable / Not valid  -- Disagree - Clinically unable to determine / Unknown  -- Refer to Clinical Documentation Reviewer    PROVIDER RESPONSE TEXT:    This patient is being treated for gram negative pneumonia.     Query created by: Jamil Villegas on 10/10/2022 2:07 PM      Electronically signed by:  Cody Anderson MD 10/10/2022 2:14 PM

## 2022-10-10 NOTE — ACP (ADVANCE CARE PLANNING)
ADVANCED CARE PLANNING    Name:Vivian Zamora       :  3/2/1933              MRN:  [id number]      Purpose of Encounter: Advanced care planning in light of Pneumonia  Parties in attendance: :Nathaly Garcia MD, Family members-  Patient Decisional Capacity: NO      Goals of Care Determinations: Family wishes Not to use  the life support measures      Code Status: At this time patient wishes to be DNR CCA    Time Spent on Advanced Planning Documents: > 15 minutes    Advanced Care Planning Documents: Completed advances directives have been completed ------------No        Electronically signed by Shereen Daly MD on 10/9/2022 at 10:34 PM  Thank you Rosalba Alegria for the opportunity to be involved in this patient's care. If you have any questions or concerns please feel free to contact me at [phone].

## 2022-10-10 NOTE — CARE COORDINATION
INITIAL CASE MANAGEMENT ASSESSMENT     Reviewed chart, spoke with patient and daughter Tereasa Severin to assess possible discharge needs. Explained Case Management role/services. Living Situation: verified demographics. Patient resides in a split level home with daughter, Pricilla Baires. Patient has a walker on each level. ADLs: independent   Assistance as needed from daughters. DME: 2 walkers, cane   Currently requiring oxygen, none prior to admission. PT/OT Recs: n/a      Active Services: no active services  Family support as needed, no active home care agency or other services. Transportation: daughter      Medications: [facility] in [place]  Patient and daughter report no barriers to obtaining medications. PCP: Ilan Lantigua      HD/PD: n/a     PLAN/COMMENTS: return home with daughter. And additional family support. ACP Completed. Discsused possible DME needs. Daughter identified chair lifts. Encouraged family to contact [facility] Cash for information. Offered to place referral with [facility]. Patient declined at this time. SW/CM provided contact information for patient or family to call with any questions. SW/CM will follow and assist as needed.     BUTCH Linares, MUW, Social Work/Case Management   [phone]  Electronically signed by BUTCH Linares, JUVENTINO on 10/10/2022 at 2:45 PM

## 2022-10-11 LAB
ANION GAP SERPL CALCULATED.3IONS-SCNC: 11 MMOL/L (ref 3–16)
BUN BLDV-MCNC: 19 MG/DL (ref 7–20)
CALCIUM SERPL-MCNC: 8 MG/DL (ref 8.3–10.6)
CHLORIDE BLD-SCNC: 101 MMOL/L (ref 99–110)
CO2: 25 MMOL/L (ref 21–32)
CREAT SERPL-MCNC: 0.7 MG/DL (ref 0.6–1.2)
GFR AFRICAN AMERICAN: >60
GFR NON-AFRICAN AMERICAN: >60
GLUCOSE BLD-MCNC: 106 MG/DL (ref 70–99)
HCT VFR BLD CALC: 31 % (ref 36–48)
HEMOGLOBIN: 10.4 G/DL (ref 12–16)
MCH RBC QN AUTO: 32.6 PG (ref 26–34)
MCHC RBC AUTO-ENTMCNC: 33.6 G/DL (ref 31–36)
MCV RBC AUTO: 97 FL (ref 80–100)
MRSA SCREEN RT-PCR: NORMAL
PDW BLD-RTO: 15.4 % (ref 12.4–15.4)
PLATELET # BLD: 255 K/UL (ref 135–450)
PMV BLD AUTO: 7.2 FL (ref 5–10.5)
POTASSIUM SERPL-SCNC: 4.1 MMOL/L (ref 3.5–5.1)
RBC # BLD: 3.19 M/UL (ref 4–5.2)
SODIUM BLD-SCNC: 137 MMOL/L (ref 136–145)
WBC # BLD: 11.9 K/UL (ref 4–11)

## 2022-10-11 PROCEDURE — 6370000000 HC RX 637 (ALT 250 FOR IP): Performed by: HOSPITALIST

## 2022-10-11 PROCEDURE — 6360000002 HC RX W HCPCS: Performed by: HOSPITALIST

## 2022-10-11 PROCEDURE — 94640 AIRWAY INHALATION TREATMENT: CPT

## 2022-10-11 PROCEDURE — 94761 N-INVAS EAR/PLS OXIMETRY MLT: CPT

## 2022-10-11 PROCEDURE — 2060000000 HC ICU INTERMEDIATE R&B

## 2022-10-11 PROCEDURE — 85027 COMPLETE CBC AUTOMATED: CPT

## 2022-10-11 PROCEDURE — 6370000000 HC RX 637 (ALT 250 FOR IP): Performed by: INTERNAL MEDICINE

## 2022-10-11 PROCEDURE — 80048 BASIC METABOLIC PNL TOTAL CA: CPT

## 2022-10-11 PROCEDURE — 99232 SBSQ HOSP IP/OBS MODERATE 35: CPT | Performed by: INTERNAL MEDICINE

## 2022-10-11 PROCEDURE — 36415 COLL VENOUS BLD VENIPUNCTURE: CPT

## 2022-10-11 PROCEDURE — 2580000003 HC RX 258: Performed by: HOSPITALIST

## 2022-10-11 PROCEDURE — 2700000000 HC OXYGEN THERAPY PER DAY

## 2022-10-11 PROCEDURE — 87641 MR-STAPH DNA AMP PROBE: CPT

## 2022-10-11 RX ADMIN — ZINC SULFATE 220 MG (50 MG) CAPSULE 50 MG: CAPSULE at 08:33

## 2022-10-11 RX ADMIN — ENOXAPARIN SODIUM 40 MG: 100 INJECTION SUBCUTANEOUS at 08:33

## 2022-10-11 RX ADMIN — ALBUTEROL SULFATE 2.5 MG: 2.5 SOLUTION RESPIRATORY (INHALATION) at 13:04

## 2022-10-11 RX ADMIN — MOMETASONE FUROATE AND FORMOTEROL FUMARATE DIHYDRATE 2 PUFF: 200; 5 AEROSOL RESPIRATORY (INHALATION) at 20:17

## 2022-10-11 RX ADMIN — ALBUTEROL SULFATE 2.5 MG: 2.5 SOLUTION RESPIRATORY (INHALATION) at 16:49

## 2022-10-11 RX ADMIN — MOMETASONE FUROATE AND FORMOTEROL FUMARATE DIHYDRATE 2 PUFF: 200; 5 AEROSOL RESPIRATORY (INHALATION) at 09:18

## 2022-10-11 RX ADMIN — PREDNISONE 40 MG: 20 TABLET ORAL at 08:33

## 2022-10-11 RX ADMIN — ALBUTEROL SULFATE 2.5 MG: 2.5 SOLUTION RESPIRATORY (INHALATION) at 20:15

## 2022-10-11 RX ADMIN — OXYCODONE HYDROCHLORIDE AND ACETAMINOPHEN 500 MG: 500 TABLET ORAL at 08:33

## 2022-10-11 RX ADMIN — FERROUS SULFATE TAB EC 324 MG (65 MG FE EQUIVALENT) 324 MG: 324 (65 FE) TABLET DELAYED RESPONSE at 08:33

## 2022-10-11 RX ADMIN — CEFEPIME 1000 MG: 1 INJECTION, POWDER, FOR SOLUTION INTRAMUSCULAR; INTRAVENOUS at 15:45

## 2022-10-11 RX ADMIN — Medication 2000 UNITS: at 08:33

## 2022-10-11 RX ADMIN — CEFEPIME 2000 MG: 2 INJECTION, POWDER, FOR SOLUTION INTRAVENOUS at 04:41

## 2022-10-11 RX ADMIN — FOLIC ACID 1000 MCG: 1 TABLET ORAL at 08:33

## 2022-10-11 RX ADMIN — Medication 1250 MG: at 21:29

## 2022-10-11 RX ADMIN — ALBUTEROL SULFATE 2.5 MG: 2.5 SOLUTION RESPIRATORY (INHALATION) at 09:18

## 2022-10-11 ASSESSMENT — PAIN SCALES - GENERAL
PAINLEVEL_OUTOF10: 0

## 2022-10-11 NOTE — PLAN OF CARE
Problem: Discharge Planning  Goal: Discharge to home or other facility with appropriate resources  Outcome: Progressing  Flowsheets (Taken 10/10/2022 2018)  Discharge to home or other facility with appropriate resources: Identify barriers to discharge with patient and caregiver     Problem: Safety - Adult  Goal: Free from fall injury  Outcome: Progressing     Problem: ABCDS Injury Assessment  Goal: Absence of physical injury  Outcome: Progressing     Problem: Pain  Goal: Verbalizes/displays adequate comfort level or baseline comfort level  Outcome: Progressing     Problem: Respiratory - Adult  Goal: Achieves optimal ventilation and oxygenation  Outcome: Progressing  Flowsheets (Taken 10/10/2022 2018)  Achieves optimal ventilation and oxygenation:   Assess for changes in respiratory status   Assess for changes in mentation and behavior   Position to facilitate oxygenation and minimize respiratory effort   Oxygen supplementation based on oxygen saturation or arterial blood gases   Respiratory therapy support as indicated     Problem: Cardiovascular - Adult  Goal: Maintains optimal cardiac output and hemodynamic stability  Outcome: Progressing  Flowsheets (Taken 10/10/2022 2018)  Maintains optimal cardiac output and hemodynamic stability:   Monitor blood pressure and heart rate   Monitor urine output and notify Licensed Independent Practitioner for values outside of normal range   Assess for signs of decreased cardiac output     Problem: Hematologic - Adult  Goal: Maintains hematologic stability  Outcome: Progressing  Flowsheets (Taken 10/10/2022 2018)  Maintains hematologic stability:   Assess for signs and symptoms of bleeding or hemorrhage   Monitor labs for bleeding or clotting disorders

## 2022-10-11 NOTE — PROGRESS NOTES
Hospitalist Progress Note  10/11/2022 9:16 AM    PCP: Kate Zelaya    [phone]     Date of Admission: 10/9/2022                                                                                                                     HOSPITAL COURSE    Patient demographics:  The patient  Castro Knight is a 80 y.o. female     Significant past medical history:   Patient Active Problem List   Diagnosis    Closed fracture of proximal end of left humerus, initial encounter    Acute respiratory failure ([address]         Presenting symptoms:  Shortness of breath    Diagnostic workup:      CONSULTS DURING ADMISSION :   IP CONSULT TO PULMONOLOGY  IP CONSULT TO SOCIAL WORK  PHARMACY TO DOSE VANCOMYCIN      Patient was diagnosed with:  Pneumonia  Rheumatoid arthritis    Treatment while inpatient:  Patient presented to the emergency room cough fever production of sputum and shortness of breath.                                                                                       ----------------------------------------------------------      SUBJECTIVE COMPLAINTS- Shortness of breath    Diet: ADULT DIET;  Regular      OBJECTIVE:   Patient Active Problem List   Diagnosis    Closed fracture of proximal end of left humerus, initial encounter    Acute respiratory failure (HCC)       Allergies  Hydrogen peroxide, Lisinopril, and Neomycin-bacitracin zn-polymyx    Medications    Scheduled Meds:   cefepime  1,000 mg IntraVENous Q12H    predniSONE  40 mg Oral Daily    sodium chloride  2,300 mL IntraVENous Once    ferrous sulfate  324 mg Oral Daily with breakfast    mometasone-formoterol  2 puff Inhalation BID    folic acid  5,936 mcg Oral Daily    vitamin C  500 mg Oral Daily    Vitamin D  2,000 Units Oral Daily    zinc sulfate  50 mg Oral Daily    sodium chloride flush  10 mL IntraVENous 2 times per day    enoxaparin  40 mg SubCUTAneous Daily    albuterol  2.5 mg Nebulization Q4H WA    vancomycin  1,250 mg IntraVENous Q24H    vancomycin (VANCOCIN) intermittent dosing (placeholder)   Other RX Placeholder     Continuous Infusions:   sodium chloride       PRN Meds:  sodium chloride flush, sodium chloride, ondansetron **OR** ondansetron, polyethylene glycol, acetaminophen **OR** acetaminophen    Vitals   Vitals /wt Patient Vitals for the past 8 hrs:   BP Temp Temp src Pulse Resp SpO2 Weight   10/11/22 0830 (!) 163/94 97.7 °F (36.5 °C) Oral 79 28 92 % --   10/11/22 0416 (!) 149/78 97.9 °F (36.6 °C) Oral 91 23 97 % 175 lb 11.3 oz (79.7 kg)        72HR INTAKE/OUTPUT:    Intake/Output Summary (Last 24 hours) at 10/11/2022 0916  Last data filed at 10/11/2022 2196  Gross per 24 hour   Intake 2221.54 ml   Output 1000 ml   Net 1221.54 ml       Exam:    Gen:   Alert and oriented ×3    Eyes: PERRL. No sclera icterus. No conjunctival injection. ENT: No discharge. Pharynx clear. External appearance of ears and nose normal.  Neck: Trachea midline. No obvious mass. Resp: Continues to have crackles and rhonchi but better air movement today than yesterday  CV: Regular rate. Regular rhythm. No murmur or rub. No edema. GI: Non-tender. Non-distended. No hernia. Skin: Warm, dry, normal texture and turgor. Lymph: No cervical LAD. No supraclavicular LAD. M/S: / Ext. No cyanosis. No clubbing. No joint deformity. Neuro: CN 2-12 are intact,  no neurologic deficits noted. PT/INR: No results for input(s): PROTIME, INR in the last 72 hours. APTT: No results for input(s): APTT in the last 72 hours. CBC:   Recent Labs     10/09/22  1431 10/11/22  0549   WBC 13.7* 11.9*   HGB 11.5* 10.4*   HCT 34.3* 31.0*   MCV 96.1 97.0    255       BMP:   Recent Labs     10/09/22  1431 10/10/22  0533 10/11/22  0549   * 135* 137   K 3.8 4.2 4.1   CL 91* 97* 101   CO2 24 23 25   BUN 21* 17 19   CREATININE 0.8 0.8 0.7       LIVER PROFILE:   Recent Labs     10/09/22  1431   ALKPHOS 99   AST 23   ALT 14   BILITOT 0.6     No results for input(s):  AMYLASE in the last 72 hours. No results for input(s): LIPASE in the last 72 hours. UA:No results for input(s): NITRITE, LABCAST, WBCUA, RBCUA, MUCUS in the last 72 hours. TROPONIN: No results for input(s): Hipolito Leonardo in the last 72 hours. Lab Results   Component Value Date/Time    URRFLXCULT Not Indicated 01/17/2022 01:58 PM       No results for input(s): TSHREFLEX in the last 72 hours. No components found for: VRT8461  POC GLUCOSE:  No results for input(s): POCGLU in the last 72 hours. No results for input(s): LABA1C in the last 72 hours. No results found for: LABA1C      ASSESSMENT AND PLAN     Pneumonia  In a patient with rheumatoid arthritis  Patient is on methotrexate and prednisone  Start patient on antibiotics with cefepime and vancomycin  Bronchodilators  Increase prednisone dose to 40 mg  Clinically patient is improving  Pulmonary consult is appreciated      Acute respiratory failure with hypoxia  Titrate oxygen for saturations greater than or equal to 90%  Currently on 1 L nasal cannula oxygen       Rheumatoid arthritis  Increase prednisone dose  Plan is to keep methotrexate on hold         Code Status: DNR-CCA        Dispo - cc        The patient and / or the family were informed of the results of any tests, a time was given to answer questions, a plan was proposed and they agreed with plan. Lakisha Mendoza MD    This note was transcribed using [id number] treadalong. Please disregard any translational errors.

## 2022-10-11 NOTE — PROGRESS NOTES
Pulmonary Progress Note     Patient's name:  Starr Lopez  Medical Record Number: [id number]  Patient's account/billing number: [de-identified]  Patient's YOB: 1933  Age: 80 y.o. 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 Starr Lopez is a 80 y.o.  female with seropositive rheumatoid arthritis on combination therapy of methotrexate and low-dose prednisone. She is currently taking methotrexate 17.5 mg a week and prednisone 2.5 mg every other day. RA related UIP, not on home oxygen, presented with worsening sob, cough, sputum colored and fever, associated with weakness and decrease appetite. No chest pain  Some AMS  No recent change in meds. CXR with increase airspace disease  PCT slightly elevated  Leukocytosis  All serology and culture negative to date.            REVIEW OF SYSTEMS:  Review of Systems -   General ROS: weakness, fever   Psychological ROS: negative  Ophthalmic ROS: negative  ENT ROS: negative  Allergy and Immunology ROS: negative  Hematological and Lymphatic ROS: negative  Endocrine ROS: negative  Breast ROS: negative  Respiratory ROS: cough, sputum and sob   Cardiovascular ROS: no chest pain or dyspnea on exertion  Gastrointestinal ROS:negative  Genito-Urinary ROS: negative  Musculoskeletal ROS: negative  Neurological ROS: negative  Dermatological ROS: negative        Physical Exam:    Vitals: BP (!) 163/94   Pulse 79   Temp 97.7 °F (36.5 °C) (Oral)   Resp 28   Ht 5' 2\" (1.575 m)   Wt 175 lb 11.3 oz (79.7 kg)   SpO2 92%   BMI 32.14 kg/m²     Last Body weight:   Wt Readings from Last 3 Encounters:   10/11/22 175 lb 11.3 oz (79.7 kg)   10/06/22 168 lb (76.2 kg)   05/26/22 155 lb (70.3 kg)       Body Mass Index : Body mass index is 32.14 kg/m². Intake and Output summary:   Intake/Output Summary (Last 24 hours) at 10/11/2022 0838  Last data filed at 10/11/2022 0983  Gross per 24 hour   Intake 2564.31 ml   Output 1000 ml   Net 1564.31 ml         Physical Examination:     PHYSICAL EXAM:    Gen:  mild acute distress  Eyes: PERRL. Anicteric sclera. No conjunctival injection. ENT: No discharge. Posterior oropharynx clear. External appearance of ears and nose normal.  Neck: Trachea midline. No mass, no lymphadenopathy    Resp:  bilateral rales, and diminished with scattered wheezing   CV: Regular rate. Regular rhythm. No murmur or rub. No edema. GI: Soft, Non-tender. Non-distended. +BS  Skin: Warm, dry, w/o erythema. Lymph: No cervical or supraclavicular LAD. M/S: No cyanosis. No clubbing. Neuro:  CN 2-12 tested, no focal neurologic deficit. Moves all extremities  Psych: Awake and alert, Oriented x 3. Judgement and insight appropriate. Mood stable.         Laboratory findings:-    CBC:   Recent Labs     10/11/22  0549   WBC 11.9*   HGB 10.4*          BMP:    Recent Labs     10/09/22  1431 10/10/22  0533 10/11/22  0549   * 135* 137   K 3.8 4.2 4.1   CL 91* 97* 101   CO2 24 23 25   BUN 21* 17 19   CREATININE 0.8 0.8 0.7   GLUCOSE 140* 164* 106*       S. Calcium:  Recent Labs     10/11/22  0549   CALCIUM 8.0*         Hepatic functions:   Recent Labs     10/09/22  1431   ALKPHOS 99   ALT 14   AST 23   PROT 6.8   BILITOT 0.6   LABALBU 3.5       Radiology Review:  Pertinent images / reports were reviewed as a part of this visit. CT Chest w/ contrast: No results found for this or any previous visit. CT Chest w/o contrast: No results found for this or any previous visit. CTPA: No results found for this or any previous visit. CXR PA/LAT: Results for orders placed during the hospital encounter of 10/09/22    XR CHEST (2 VW)    Narrative  EXAMINATION:  TWO XRAY VIEWS OF THE CHEST    10/9/2022 11:08 am    COMPARISON:  07/24/2009 and 01/17/2022. HISTORY:  ORDERING SYSTEM PROVIDED HISTORY: cough  TECHNOLOGIST PROVIDED HISTORY:  Reason for exam:->cough  Reason for Exam: cough, congestion x 1 week; fever since today    FINDINGS:  Peripheral linearity throughout the lungs is again noted. There are new  patchy opacities at the lung bases. The costophrenic angles are sharp. The  cardiomediastinal silhouette and pulmonary vessels appear normal.    Impression  New patchy opacities at the lung bases suspicious for pneumonia. Radiographic follow-up is recommended. Pulmonary fibrosis similar to the prior study. CXR portable: Results for orders placed during the hospital encounter of 01/17/22    XR CHEST PORTABLE    Narrative  EXAMINATION:  ONE XRAY VIEW OF THE CHEST    1/17/2022 1:19 pm    COMPARISON:  2009    HISTORY:  ORDERING SYSTEM PROVIDED HISTORY: fall, AMS  TECHNOLOGIST PROVIDED HISTORY:  Reason for exam:->fall, AMS  Reason for Exam: fall, AMS    FINDINGS:  Mild cardiomegaly. Moderate interstitial fibrotic changes identified in the  lungs. The lung changes are mostly chronic. Bibasilar atelectasis. No  pneumothorax. Significant osteopenic changes and degenerative changes noted in the bony  structures. No displaced rib fractures. Impression  Moderate interstitial fibrotic changes in the lungs. No pneumothorax. No displaced rib fractures.                      Ramila Tang MD, M.D.            10/11/2022, 8:38 AM

## 2022-10-12 LAB
ANION GAP SERPL CALCULATED.3IONS-SCNC: 11 MMOL/L (ref 3–16)
BUN BLDV-MCNC: 19 MG/DL (ref 7–20)
CALCIUM SERPL-MCNC: 8.5 MG/DL (ref 8.3–10.6)
CHLORIDE BLD-SCNC: 98 MMOL/L (ref 99–110)
CO2: 26 MMOL/L (ref 21–32)
CREAT SERPL-MCNC: 0.7 MG/DL (ref 0.6–1.2)
GFR AFRICAN AMERICAN: >60
GFR NON-AFRICAN AMERICAN: >60
GLUCOSE BLD-MCNC: 107 MG/DL (ref 70–99)
HCT VFR BLD CALC: 30.4 % (ref 36–48)
HEMOGLOBIN: 10.2 G/DL (ref 12–16)
MCH RBC QN AUTO: 32 PG (ref 26–34)
MCHC RBC AUTO-ENTMCNC: 33.4 G/DL (ref 31–36)
MCV RBC AUTO: 95.9 FL (ref 80–100)
PDW BLD-RTO: 15.1 % (ref 12.4–15.4)
PLATELET # BLD: 277 K/UL (ref 135–450)
PMV BLD AUTO: 7 FL (ref 5–10.5)
POTASSIUM SERPL-SCNC: 4 MMOL/L (ref 3.5–5.1)
RBC # BLD: 3.17 M/UL (ref 4–5.2)
SODIUM BLD-SCNC: 135 MMOL/L (ref 136–145)
VANCOMYCIN RANDOM: 18.8 UG/ML
WBC # BLD: 11.6 K/UL (ref 4–11)

## 2022-10-12 PROCEDURE — 97530 THERAPEUTIC ACTIVITIES: CPT

## 2022-10-12 PROCEDURE — 6360000002 HC RX W HCPCS: Performed by: HOSPITALIST

## 2022-10-12 PROCEDURE — 94640 AIRWAY INHALATION TREATMENT: CPT

## 2022-10-12 PROCEDURE — 99233 SBSQ HOSP IP/OBS HIGH 50: CPT | Performed by: INTERNAL MEDICINE

## 2022-10-12 PROCEDURE — 97116 GAIT TRAINING THERAPY: CPT | Performed by: PHYSICAL THERAPIST

## 2022-10-12 PROCEDURE — 6370000000 HC RX 637 (ALT 250 FOR IP): Performed by: INTERNAL MEDICINE

## 2022-10-12 PROCEDURE — 94761 N-INVAS EAR/PLS OXIMETRY MLT: CPT

## 2022-10-12 PROCEDURE — 97166 OT EVAL MOD COMPLEX 45 MIN: CPT

## 2022-10-12 PROCEDURE — 6370000000 HC RX 637 (ALT 250 FOR IP): Performed by: HOSPITALIST

## 2022-10-12 PROCEDURE — 97530 THERAPEUTIC ACTIVITIES: CPT | Performed by: PHYSICAL THERAPIST

## 2022-10-12 PROCEDURE — 80048 BASIC METABOLIC PNL TOTAL CA: CPT

## 2022-10-12 PROCEDURE — 97162 PT EVAL MOD COMPLEX 30 MIN: CPT | Performed by: PHYSICAL THERAPIST

## 2022-10-12 PROCEDURE — 2060000000 HC ICU INTERMEDIATE R&B

## 2022-10-12 PROCEDURE — 2700000000 HC OXYGEN THERAPY PER DAY

## 2022-10-12 PROCEDURE — 85027 COMPLETE CBC AUTOMATED: CPT

## 2022-10-12 PROCEDURE — 36415 COLL VENOUS BLD VENIPUNCTURE: CPT

## 2022-10-12 PROCEDURE — 2580000003 HC RX 258: Performed by: HOSPITALIST

## 2022-10-12 PROCEDURE — 97535 SELF CARE MNGMENT TRAINING: CPT

## 2022-10-12 PROCEDURE — 80202 ASSAY OF VANCOMYCIN: CPT

## 2022-10-12 RX ADMIN — ALBUTEROL SULFATE 2.5 MG: 2.5 SOLUTION RESPIRATORY (INHALATION) at 20:22

## 2022-10-12 RX ADMIN — ZINC SULFATE 220 MG (50 MG) CAPSULE 50 MG: CAPSULE at 10:19

## 2022-10-12 RX ADMIN — CEFEPIME 1000 MG: 1 INJECTION, POWDER, FOR SOLUTION INTRAMUSCULAR; INTRAVENOUS at 16:10

## 2022-10-12 RX ADMIN — ALBUTEROL SULFATE 2.5 MG: 2.5 SOLUTION RESPIRATORY (INHALATION) at 12:23

## 2022-10-12 RX ADMIN — OXYCODONE HYDROCHLORIDE AND ACETAMINOPHEN 500 MG: 500 TABLET ORAL at 10:19

## 2022-10-12 RX ADMIN — FERROUS SULFATE TAB EC 324 MG (65 MG FE EQUIVALENT) 324 MG: 324 (65 FE) TABLET DELAYED RESPONSE at 10:19

## 2022-10-12 RX ADMIN — ENOXAPARIN SODIUM 40 MG: 100 INJECTION SUBCUTANEOUS at 10:21

## 2022-10-12 RX ADMIN — SODIUM CHLORIDE, PRESERVATIVE FREE 10 ML: 5 INJECTION INTRAVENOUS at 20:40

## 2022-10-12 RX ADMIN — Medication 2000 UNITS: at 10:19

## 2022-10-12 RX ADMIN — FOLIC ACID 1000 MCG: 1 TABLET ORAL at 10:19

## 2022-10-12 RX ADMIN — CEFEPIME 1000 MG: 1 INJECTION, POWDER, FOR SOLUTION INTRAMUSCULAR; INTRAVENOUS at 04:39

## 2022-10-12 RX ADMIN — ALBUTEROL SULFATE 2.5 MG: 2.5 SOLUTION RESPIRATORY (INHALATION) at 08:54

## 2022-10-12 RX ADMIN — MOMETASONE FUROATE AND FORMOTEROL FUMARATE DIHYDRATE 2 PUFF: 200; 5 AEROSOL RESPIRATORY (INHALATION) at 08:53

## 2022-10-12 RX ADMIN — PREDNISONE 40 MG: 20 TABLET ORAL at 10:19

## 2022-10-12 RX ADMIN — ALBUTEROL SULFATE 2.5 MG: 2.5 SOLUTION RESPIRATORY (INHALATION) at 17:08

## 2022-10-12 RX ADMIN — MOMETASONE FUROATE AND FORMOTEROL FUMARATE DIHYDRATE 2 PUFF: 200; 5 AEROSOL RESPIRATORY (INHALATION) at 20:22

## 2022-10-12 ASSESSMENT — PAIN SCALES - GENERAL: PAINLEVEL_OUTOF10: 0

## 2022-10-12 NOTE — PROGRESS NOTES
Pulmonary Progress Note     Patient's name:  Fam Obando  Medical Record Number: [id number]  Patient's account/billing number: [de-identified]  Patient's YOB: 1933  Age: 80 y.o. 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 DVT prophylaxis       Subjective:  Delirious at times  No fever  Still sob, cough and congestion    HISTORY OF PRESENT ILLNESS:   Mr./Ms. Fam Obando is a 80 y.o.  female with seropositive rheumatoid arthritis on combination therapy of methotrexate and low-dose prednisone. She is currently taking methotrexate 17.5 mg a week and prednisone 2.5 mg every other day. RA related UIP, not on home oxygen, presented with worsening sob, cough, sputum colored and fever, associated with weakness and decrease appetite. No chest pain  Some AMS  No recent change in meds. CXR with increase airspace disease  PCT slightly elevated  Leukocytosis  All serology and culture negative to date.            REVIEW OF SYSTEMS:  Review of Systems -   General ROS: weakness, fever   Psychological ROS: negative  Ophthalmic ROS: negative  ENT ROS: negative  Allergy and Immunology ROS: negative  Hematological and Lymphatic ROS: negative  Endocrine ROS: negative  Breast ROS: negative  Respiratory ROS: cough, sputum and sob   Cardiovascular ROS: no chest pain or dyspnea on exertion  Gastrointestinal ROS:negative  Genito-Urinary ROS: negative  Musculoskeletal ROS: negative  Neurological ROS: negative  Dermatological ROS: negative        Physical Exam:    Vitals: BP (!) 142/72   Pulse 76   Temp 98 °F (36.7 °C) (Oral)   Resp 22   Ht 5' 2\" (1.575 m)   Wt 179 lb 3.7 oz (81.3 kg)   SpO2 100%   BMI 32.78 kg/m²     Last Body weight:   Wt Readings from Last 3 Encounters:   10/12/22 179 lb 3.7 oz (81.3 kg)   10/06/22 168 lb (76.2 kg)   05/26/22 155 lb (70.3 kg)       Body Mass Index : Body mass index is 32.78 kg/m². Intake and Output summary:   Intake/Output Summary (Last 24 hours) at 10/12/2022 1053  Last data filed at 10/12/2022 0423  Gross per 24 hour   Intake 352.2 ml   Output 550 ml   Net -197.8 ml         Physical Examination:     PHYSICAL EXAM:    Gen:  mild acute distress  Eyes: PERRL. Anicteric sclera. No conjunctival injection. ENT: No discharge. Posterior oropharynx clear. External appearance of ears and nose normal.  Neck: Trachea midline. No mass, no lymphadenopathy    Resp:  bilateral rales, and diminished with scattered wheezing   CV: Regular rate. Regular rhythm. No murmur or rub. No edema. GI: Soft, Non-tender. Non-distended. +BS  Skin: Warm, dry, w/o erythema. Lymph: No cervical or supraclavicular LAD. M/S: No cyanosis. No clubbing. Neuro:  CN 2-12 tested, no focal neurologic deficit. Moves all extremities  Psych: Awake and alert, Oriented x 3. Judgement and insight appropriate. Mood stable.         Laboratory findings:-    CBC:   Recent Labs     10/12/22  0417   WBC 11.6*   HGB 10.2*          BMP:    Recent Labs     10/10/22  0533 10/11/22  0549 10/12/22  0417   * 137 135*   K 4.2 4.1 4.0   CL 97* 101 98*   CO2 23 25 26   BUN 17 19 19   CREATININE 0.8 0.7 0.7   GLUCOSE 164* 106* 107*       S. Calcium:  Recent Labs     10/12/22  0417   CALCIUM 8.5         Hepatic functions:   Recent Labs     10/09/22  1431   ALKPHOS 99   ALT 14   AST 23   PROT 6.8   BILITOT 0.6   LABALBU 3.5       Radiology Review:  Pertinent images / reports

## 2022-10-12 NOTE — PROGRESS NOTES
Pt has negative MRSA nasal swab from 10/11 @ 2419. An order was entered around [place] to collect another MRSA nasal swab. RN deferring collection of a second MRSA nasal swab until order clarification can be obtained from ordering provider.

## 2022-10-12 NOTE — CARE COORDINATION
10/12/22 1621   IMM Letter   IMM Letter given to Patient/Family/Significant other/Guardian/POA/by: Provided to patient and daughter Laura Tillman at bedside by BUTCH Lau, JUVENTINO. IMM Letter date given: 10/12/22   IMM Letter time given: 1600   Provided to patient and daughter at bedside by BUTCH Lau, JUVENTINO. Education provided to patient, patient reported no questions and verbalized understanding. Patient aware of 4 hours allotted time to determine if they choose to pursue Medicare appeal process.

## 2022-10-12 NOTE — PROGRESS NOTES
Physical Therapy  Facility/Department: [facility] PROGRESSIVE CARE  Physical Therapy Initial Assessment    Name: Nicolas Astorga  : 3/2/1933  MRN: [id number]  Date of Service: 10/12/2022    Discharge Recommendations:  24 hour supervision or assist, Home with Home health PT   PT Equipment Recommendations  Equipment Needed: No    Nicolas Astorga scored a 18/24 on the AM-PAC short mobility form. Current research shows that an AM-PAC score of 18 or greater is typically associated with a discharge to the patient's home setting. Based on the patient's AM-PAC score and their current functional mobility deficits, it is recommended that the patient have 2-3 sessions per week of Physical Therapy at d/c to increase the patient's independence. At this time, this patient demonstrates the endurance and safety to discharge home with Home PT and a follow up treatment frequency of 2-3x/wk. Please see assessment section for further patient specific details. If patient discharges prior to next session this note will serve as a discharge summary. Please see below for the latest assessment towards goals. Patient Diagnosis(es): The primary encounter diagnosis was Acute respiratory failure with hypoxia ([address] Diagnoses of Pneumonia of both lower lobes due to infectious organism and Severe sepsis [facility]) were also pertinent to this visit. Past Medical History:  has a past medical history of Arm fracture, CKD (chronic kidney disease), Hypertension, Interstitial pulmonary disease ([address] Pulmonary fibrosis ([address] Rheumatoid arteritis ([facility] [address] and Vitamin D deficiency. Past Surgical History:  has a past surgical history that includes Cholecystectomy and Wrist surgery (Right).     Assessment   Body Structures, Functions, Activity Limitations Requiring Skilled Therapeutic Intervention: Decreased functional mobility   Assessment: pt is an 81 yo female who was adm to hosp with SOB; pt at baseline is Ind in her home with a RW; pt currently needing CGA for mobility tasks with a RW and now on 4 liters of O2 with decreased O2 sats with activity; daughter reports they can provide 24/7 assist as needed at home; recommend home with initial 24/7 assist and home PT  Therapy Prognosis: Good  Decision Making: Medium Complexity  Barriers to Learning: forgetful  Requires PT Follow-Up: Yes  Activity Tolerance  Activity Tolerance: Patient limited by endurance  Activity Tolerance Comments: limited by breathing status     Plan   Physcial Therapy Plan  General Plan: 3-5 times per week  Current Treatment Recommendations: Functional mobility training  Safety Devices  Type of Devices: Call light within reach, Chair alarm in place, Left in chair, Nurse notified     Restrictions  Restrictions/Precautions  Restrictions/Precautions: Fall Risk  Position Activity Restriction  Other position/activity restrictions: 4 L O2 via NC     Subjective   General  Chart Reviewed: Yes  Patient assessed for rehabilitation services?: Yes  Additional Pertinent Hx: per ER note: \"Vivian Parrish is a 80 y.o. female who presents along with her daughter who gives majority of the history. Patient does have an ongoing chronic cough and has a history of interstitial lung disease as well as pulmonary fibrosis. However on Friday night she started to feel worse with increasing cough, the shortness of breath and decreased energy and strength yesterday and especially today. Fever this morning was 101.3 according to her daughter. She was given some Tylenol. She was brought in for further care and treatment. Patient does not use oxygen supplementation on a daily basis. She has been wiped out and barely had the energy to stand this morning. No particular complaint of pain at this time. \"  Family / Caregiver Present: Yes (daughter in room)  Referring Practitioner: Dr Omyara Mendoza  Referral Date : 10/11/22  Follows Commands: Within Functional Limits  Subjective  Subjective: pt very pleasant and agreeable to working with therapy         Social/Functional History  Social/Functional History  Lives With: Daughter  Type of Home: House  Home Layout: Multi-level, Able to Live on Main level with bedroom/bathroom (bed and bath on main level, kitchen and living space up 3 steps)  Home Access: Stairs to enter with rails  Entrance Stairs - Number of Steps: 3 & 1& 1 & 3 or full flight from basement with main level with rail  Entrance Stairs - Rails: None  Bathroom Shower/Tub: Tub/Shower unit, Walk-in shower, Shower chair with back (walk in shower by pt with Shower chair)  Bathroom Toilet: Standard  Bathroom Equipment: Grab bars in shower, Shower chair, 3-in-1 commode  Bathroom Accessibility: Accessible  Home Equipment: [employer], rolling  Has the patient had two or more falls in the past year or any fall with injury in the past year?: Yes  Receives Help From: Family  ADL Assistance: Independent (dtr reports she showers by herself; family assists with hair)  Homemaking Assistance: Needs assistance (Dtr cooks, family assists with cleaning)  Ambulation Assistance: Needs assistance (cane PRN, RW if back is bothering her, holds onto family in community)  Transfer Assistance: Independent  Active : No  Leisure & Hobbies: crosswords, puzzles, nimco, facebook  Additional Comments: 3 steps up with B rails to kitchen area and family room, there is a RW on each level, has a cane for patio  Vision/Hearing  Vision  Vision: Impaired  Vision Exceptions: Wears glasses at all times  Hearing  Hearing: Exceptions to [facility]  Hearing Exceptions: Bilateral hearing aid    Cognition   Orientation  Overall Orientation Status: Within Functional Limits  Cognition  Overall Cognitive Status: WFL  Cognition Comment: forgetful at times     Objective   Heart Rate: 68  Heart Rate Source: Monitor  BP: (!) 153/76  BP Location: Right upper arm  BP Method: Automatic  Patient Position: Semi fowlers  MAP (Calculated): 101.67  Resp: 16  SpO2: 94 %  O2 Device: Nasal cannula (add humidification)              AROM RLE (degrees)  RLE AROM: WFL  AROM LLE (degrees)  LLE AROM : WFL  Strength RLE  Strength RLE: WFL  Strength LLE  Strength LLE: WFL           Bed mobility  Supine to Sit: Contact guard assistance (with pulling on bedrail; HOB slightly elevated)  Transfers  Sit to Stand: Contact guard assistance  Stand to Sit: Contact guard assistance  Ambulation  Surface: Level tile  Device: Rolling Walker  Assistance: Contact guard assistance  Quality of Gait: slow pace with some SOB with activity; spO2 decreased to 84% on 4 liters with amb  Distance: 10', 3' and 25'  Comments: therapist managed IV pole and O2 lines; pt used commode and stood at sink to wash her hands; assisted with positioning in chair for comfort with all needs in reach and LEs elevated     Balance  Comments: sup for sitting balance; CGA/close SBA for standing balance           OutComes Score                                                  AM-PAC Score  AM-PAC Inpatient Mobility Raw Score : 18 (10/12/22 0843)  AM-PAC Inpatient T-Scale Score : 43.63 (10/12/22 0843)  Mobility Inpatient CMS 0-100% Score: 46.58 (10/12/22 0843)  Mobility Inpatient CMS G-Code Modifier : CK (10/12/22 0843)          Tinneti Score       Goals  Short Term Goals  Time Frame for Short Term Goals: by discharge  Short Term Goal 1: bed mob MI  Short Term Goal 2: transfers MI  Short Term Goal 3: amb 48' with RW SBA while maintaining his O2 sats >90%  Patient Goals   Patient Goals : to return home       Education  Patient Education  Education Given To: Patient  Education Provided: Role of Therapy  Education Provided Comments: reviewed call light and not getting up without assist  Education Method: Verbal;Demonstration  Education Outcome: Verbalized understanding      Therapy Time   Individual Concurrent Group Co-treatment   Time In 0745         Time Out 0466         Minutes 61                 JEVON CORNEJO, PT   Electronically signed by JEVON ANGELES ULM, PT on 10/12/2022 at 8:45 AM

## 2022-10-12 NOTE — PROGRESS NOTES
Hospitalist Progress Note  10/12/2022 9:24 AM    PCP: Wong Wang    [id number]     Date of Admission: 10/9/2022                                                                                                                     HOSPITAL COURSE    Patient demographics:  The patient  Mira Townsend is a 80 y.o. female     Significant past medical history:   Patient Active Problem List   Diagnosis    Closed fracture of proximal end of left humerus, initial encounter    Acute respiratory failure ([address]         Presenting symptoms:  Shortness of breath    Diagnostic workup:      CONSULTS DURING ADMISSION :   IP CONSULT TO PULMONOLOGY  IP CONSULT TO SOCIAL WORK  PHARMACY TO DOSE VANCOMYCIN      Patient was diagnosed with:  Pneumonia  Rheumatoid arthritis    Treatment while inpatient:  Patient presented to the emergency room cough fever production of sputum and shortness of breath.                                                                                       ----------------------------------------------------------      SUBJECTIVE COMPLAINTS- Shortness of breath    Diet: ADULT DIET;  Regular      OBJECTIVE:   Patient Active Problem List   Diagnosis    Closed fracture of proximal end of left humerus, initial encounter    Acute respiratory failure (HCC)       Allergies  Hydrogen peroxide, Lisinopril, and Neomycin-bacitracin zn-polymyx    Medications    Scheduled Meds:   cefepime  1,000 mg IntraVENous Q12H    predniSONE  40 mg Oral Daily    sodium chloride  2,300 mL IntraVENous Once    ferrous sulfate  324 mg Oral Daily with breakfast    mometasone-formoterol  2 puff Inhalation BID    folic acid  6,072 mcg Oral Daily    vitamin C  500 mg Oral Daily    Vitamin D  2,000 Units Oral Daily    zinc sulfate  50 mg Oral Daily    sodium chloride flush  10 mL IntraVENous 2 times per day    enoxaparin  40 mg SubCUTAneous Daily    albuterol  2.5 mg Nebulization Q4H WA    vancomycin  1,250 mg IntraVENous Q24H    vancomycin (VANCOCIN) intermittent dosing (placeholder)   Other RX Placeholder     Continuous Infusions:   sodium chloride 10 mL/hr at 10/12/22 0438     PRN Meds:  sodium chloride flush, sodium chloride, ondansetron **OR** ondansetron, polyethylene glycol, acetaminophen **OR** acetaminophen    Vitals   Vitals /wt Patient Vitals for the past 8 hrs:   BP Temp Temp src Pulse Resp SpO2 Weight   10/12/22 0906 -- -- -- 76 -- -- --   10/12/22 0858 -- -- -- 71 20 100 % --   10/12/22 0441 (!) 153/76 97.9 °F (36.6 °C) Oral 68 16 94 % 179 lb 3.7 oz (81.3 kg)        72HR INTAKE/OUTPUT:    Intake/Output Summary (Last 24 hours) at 10/12/2022 0924  Last data filed at 10/12/2022 0423  Gross per 24 hour   Intake 372.97 ml   Output 550 ml   Net -177.03 ml       Exam:    Gen:   Alert and oriented ×3    Eyes: PERRL. No sclera icterus. No conjunctival injection. ENT: No discharge. Pharynx clear. External appearance of ears and nose normal.  Neck: Trachea midline. No obvious mass. Resp: Continues to have crackles and rhonchi but better air movement today than yesterday  CV: Regular rate. Regular rhythm. No murmur or rub. No edema. GI: Non-tender. Non-distended. No hernia. Skin: Warm, dry, normal texture and turgor. Lymph: No cervical LAD. No supraclavicular LAD. M/S: / Ext. No cyanosis. No clubbing. No joint deformity. Neuro: CN 2-12 are intact,  no neurologic deficits noted. PT/INR: No results for input(s): PROTIME, INR in the last 72 hours. APTT: No results for input(s): APTT in the last 72 hours.     CBC:   Recent Labs     10/09/22  1431 10/11/22  0549 10/12/22  0417   WBC 13.7* 11.9* 11.6*   HGB 11.5* 10.4* 10.2*   HCT 34.3* 31.0* 30.4*   MCV 96.1 97.0 95.9    255 277       BMP:   Recent Labs     10/09/22  1431 10/10/22  0533 10/11/22  0549 10/12/22  0417   * 135* 137 135*   K 3.8 4.2 4.1 4.0   CL 91* 97* 101 98*   CO2 24 23 25 26   BUN 21* 17 19 19   CREATININE 0.8 0.8 0.7 0.7       LIVER PROFILE:   Recent Labs

## 2022-10-12 NOTE — CARE COORDINATION
Discharge Planning:   Met with patient and daughter at bedside. Discussed discharge plan. Notified that per chart review, patient is nearing discharge. Provided IMM Letter. Verified discharge plan. PLAN: return home with family for 24 hour supervision.      NEED: follow for O2 needs - may need home oxygen evaluation prior to discharge    BUTCH To, [place], Social Work/Case Management   [phone]  Electronically signed by BUTCH To, [facility] on 10/12/2022 at 4:20 PM

## 2022-10-12 NOTE — PROGRESS NOTES
Occupational Therapy  Facility/Department: [facility] PROGRESSIVE CARE  Occupational Therapy Initial Assessment  Should patient be discharged prior to another treatment session, this note shall serve as the discharge summary. Name: Darrell Gates  : 3/2/1933  MRN: [id number]  Date of Service: 10/12/2022    Discharge Recommendations:  Continue to assess pending progress, 24 hour supervision or assist, Home with Home health OT, Patient would benefit from continued therapy after discharge, 2-3 sessions per week          Patient Diagnosis(es): The primary encounter diagnosis was Acute respiratory failure with hypoxia ([facility] Diagnoses of Pneumonia of both lower lobes due to infectious organism and Severe sepsis [facility]) were also pertinent to this visit. Past Medical History:  has a past medical history of Arm fracture, CKD (chronic kidney disease), Hypertension, Interstitial pulmonary disease ([facility] [address] Pulmonary fibrosis ([facility] [address] Rheumatoid arteritis ([facility] [address] and Vitamin D deficiency. Past Surgical History:  has a past surgical history that includes Cholecystectomy and Wrist surgery (Right). Assessment   Performance deficits / Impairments: Decreased functional mobility ; Decreased high-level IADLs;Decreased ADL status; Decreased endurance;Decreased strength;Decreased balance;Decreased safe awareness  Assessment: Pt presents with SOB, diagnosed with PNA. Pt lives at home with dtr but is independent with self care and functional mobility and has assist for IADLs. Pt currently on 4L O2 and required CGA with functional mobility, min A for toileting. Recommend cont OT to increase independence with self care and mobility prior to d/c home with family assist and [address] [place] [address]. Rec  assist if pt d/c on O2.   Prognosis: Good  Decision Making: Medium Complexity  History: see above  Exam: mobility, self care  Assistance / Modification: assist of 1, RW  REQUIRES OT FOLLOW-UP: Yes  Activity Tolerance  Activity Tolerance: Patient limited by fatigue  Activity Tolerance Comments: O2 sat dropped with mobility, on 4L O2, dropped as low as 84%        Plan   Occupational Therapy Plan  Times Per Week: 3-5  Times Per Day: Once a day  Current Treatment Recommendations: Strengthening, Balance training, Functional mobility training, Endurance training, Safety education & training, Patient/Caregiver education & training, Self-Care / ADL, Home management training, Equipment evaluation, education, & procurement     Restrictions  Restrictions/Precautions  Restrictions/Precautions: Fall Risk  Position Activity Restriction  Other position/activity restrictions: 4 L O2 via NC    Subjective   General  Chart Reviewed: Yes, Orders, Progress Notes, History and Physical  Patient assessed for rehabilitation services?: Yes  Additional Pertinent Hx: Per Dr. James Vickers, \"The patient Leslie Wan is a 80 y. o.female with medical history significant for rheumatoid arthritis COPD pulmonary fibrosis interstitial lung disease hypertension. Patient presented to the emergency room cough fever production of sputum and shortness of breath. Patient is unable to provide any history patient's daughter is on the bedside who reported that patient has interstitial lung disease as her baseline most likely complication of rheumatoid arthritis. It has been getting worse for the last several days and patient spiked a fever up to 101.3. Patient has been very lethargic weak with lack of energy. Normally patient does not use oxygen at home. No complaint of chest pain no nausea vomiting or diarrhea no history of hematemesis or melena. \"  Family / Caregiver Present: Yes (Dtr)  Referring Practitioner: Dr. James Vickers  Diagnosis: PNA  Subjective  Subjective: Seen in room, agreed to OT, no complaints     Social/Functional History  Social/Functional History  Lives With: Daughter  Type of Home: House  Home Layout: Multi-level, Able to Live on Main level with bedroom/bathroom (bed and bath on main level, kitchen and living space up 3 steps)  Home Access: Stairs to enter with rails  Entrance Stairs - Number of Steps: 3 & 1& 1 & 3 or full flight from basement with main level with rail  Entrance Stairs - Rails: None  Bathroom Shower/Tub: Tub/Shower unit, Walk-in shower, Shower chair with back (walk in shower by pt with Shower chair)  Bathroom Toilet: Standard  Bathroom Equipment: Grab bars in shower, Shower chair, 3-in-1 commode  Bathroom Accessibility: Accessible  Home Equipment: [employer], rolling  Has the patient had two or more falls in the past year or any fall with injury in the past year?: Yes  Receives Help From: Family  ADL Assistance: Independent (dtr reports she showers by herself; family assists with hair)  Homemaking Assistance: Needs assistance (Dtr cooks, family assists with cleaning)  Ambulation Assistance: Needs assistance (cane PRN, RW if back is bothering her, holds onto family in community)  Transfer Assistance: Independent  Active : No  Leisure & Hobbies: crosswords, puzzles, nimco, Dr. Scribbles  Additional Comments: 3 steps up with B rails to kitchen area and family room, there is a RW on each level, has a cane for patio       Objective   Heart Rate: 68  Heart Rate Source: Monitor  BP: (!) 153/76  BP Location: Right upper arm  BP Method: Automatic  Patient Position: Semi fowlers  MAP (Calculated): 101.67  Resp: 16  SpO2: 94 %  O2 Device: Nasal cannula (add humidification)             Safety Devices  Type of Devices: Call light within reach; Chair alarm in place; Left in chair;Nurse notified     Toilet Transfers  Toilet - Technique: Ambulating  Equipment Used: Grab bars  Toilet Transfer: Contact guard assistance  Strength:  Within functional limits  Coordination: Within functional limits  Tone: Normal  Sensation: Intact  ADL  Feeding: Setup  Grooming: Stand by assistance  Grooming Skilled Clinical Factors: Washed hands at sink  Toileting: Minimal assistance  Toileting Skilled Clinical Factors: OT assisted with pulling up underwear on left side (pt favoring UE)     Activity Tolerance  Activity Tolerance: Patient limited by endurance  Activity Tolerance Comments: limited by breathing status  Bed mobility  Supine to Sit: Contact guard assistance (HOB elevated)  Sit to Supine: Unable to assess  Bed Mobility Comments: Left in chair at end of session  Transfers  Sit to stand: Contact guard assistance  Stand to sit: Contact guard assistance  Vision  Vision: Impaired  Vision Exceptions: Wears glasses at all times  Hearing  Hearing: Exceptions to [facility]  Hearing Exceptions: Bilateral hearing aid  Cognition  Overall Cognitive Status: [facility]  Cognition Comment: forgetful at times  Orientation  Overall Orientation Status: Within Functional Limits                  Education Given To: Patient; Family  Education Provided: Role of Therapy;Plan of Care;ADL Adaptive Strategies;Transfer Training  Education Method: Demonstration  Barriers to Learning: None  Education Outcome: Verbalized understanding;Demonstrated understanding  LUE AROM (degrees)  LUE General AROM: NT, pt favoring UE due to hx of fx in humerus  RUE AROM (degrees)  RUE AROM : WFL                          OutComes Score   Vivian Zamora scored a 21/24 on the AM-PAC ADL Inpatient form. Current research shows that an AM-PAC score of 18 or greater is typically associated with a discharge to the patient's home setting. Based on the patient's AM-PAC score, and their current ADL deficits, it is recommended that the patient have 2-3 sessions per week of Occupational Therapy at d/c to increase the patient's independence. At this time, this patient demonstrates the endurance and safety to discharge home with home services and a follow up treatment frequency of 2-3x/wk. Please see assessment section for further patient specific details. If patient discharges prior to next session this note will serve as a discharge summary.   Please see below for the latest assessment towards goals. AM-PAC Score        AM-PAC Inpatient Daily Activity Raw Score: 21 (10/12/22 0847)  AM-PAC Inpatient ADL T-Scale Score : 44.27 (10/12/22 0847)  ADL Inpatient CMS 0-100% Score: 32.79 (10/12/22 0847)  ADL Inpatient CMS G-Code Modifier : CJ (10/12/22 0847)           Goals  Short Term Goals  Time Frame for Short Term Goals: 1 week  Short Term Goal 1: Pt will be mod I with functional transfers  Short Term Goal 2: Pt will be mod I with functional mobility  Short Term Goal 3: Pt will be mod I with bathing and dressing  Short Term Goal 4: Pt will be mod I with toileting  Short Term Goal 5: Pt will be mod I with bed mobility  Patient Goals   Patient goals :  To return home with family       Therapy Time   Individual Concurrent Group Co-treatment   Time In 0745         Time Out 0844         Minutes 59         Timed Code Treatment Minutes: [id number]

## 2022-10-13 LAB
ANION GAP SERPL CALCULATED.3IONS-SCNC: 10 MMOL/L (ref 3–16)
BLOOD CULTURE, ROUTINE: NORMAL
BUN BLDV-MCNC: 21 MG/DL (ref 7–20)
CALCIUM SERPL-MCNC: 8.4 MG/DL (ref 8.3–10.6)
CHLORIDE BLD-SCNC: 100 MMOL/L (ref 99–110)
CO2: 26 MMOL/L (ref 21–32)
CREAT SERPL-MCNC: 0.6 MG/DL (ref 0.6–1.2)
CULTURE, BLOOD 2: NORMAL
GFR AFRICAN AMERICAN: >60
GFR NON-AFRICAN AMERICAN: >60
GLUCOSE BLD-MCNC: 110 MG/DL (ref 70–99)
HCT VFR BLD CALC: 30.4 % (ref 36–48)
HEMOGLOBIN: 10.3 G/DL (ref 12–16)
MCH RBC QN AUTO: 32.6 PG (ref 26–34)
MCHC RBC AUTO-ENTMCNC: 33.9 G/DL (ref 31–36)
MCV RBC AUTO: 96.2 FL (ref 80–100)
PDW BLD-RTO: 15.1 % (ref 12.4–15.4)
PLATELET # BLD: 301 K/UL (ref 135–450)
PMV BLD AUTO: 6.8 FL (ref 5–10.5)
POTASSIUM SERPL-SCNC: 4.6 MMOL/L (ref 3.5–5.1)
RBC # BLD: 3.16 M/UL (ref 4–5.2)
SODIUM BLD-SCNC: 136 MMOL/L (ref 136–145)
WBC # BLD: 11.7 K/UL (ref 4–11)

## 2022-10-13 PROCEDURE — 2580000003 HC RX 258: Performed by: HOSPITALIST

## 2022-10-13 PROCEDURE — 85027 COMPLETE CBC AUTOMATED: CPT

## 2022-10-13 PROCEDURE — 80048 BASIC METABOLIC PNL TOTAL CA: CPT

## 2022-10-13 PROCEDURE — 6360000002 HC RX W HCPCS: Performed by: HOSPITALIST

## 2022-10-13 PROCEDURE — 6370000000 HC RX 637 (ALT 250 FOR IP): Performed by: INTERNAL MEDICINE

## 2022-10-13 PROCEDURE — 2060000000 HC ICU INTERMEDIATE R&B

## 2022-10-13 PROCEDURE — 36415 COLL VENOUS BLD VENIPUNCTURE: CPT

## 2022-10-13 PROCEDURE — 94640 AIRWAY INHALATION TREATMENT: CPT

## 2022-10-13 PROCEDURE — 99232 SBSQ HOSP IP/OBS MODERATE 35: CPT | Performed by: INTERNAL MEDICINE

## 2022-10-13 PROCEDURE — 2700000000 HC OXYGEN THERAPY PER DAY

## 2022-10-13 PROCEDURE — 6370000000 HC RX 637 (ALT 250 FOR IP): Performed by: HOSPITALIST

## 2022-10-13 PROCEDURE — 94761 N-INVAS EAR/PLS OXIMETRY MLT: CPT

## 2022-10-13 RX ORDER — FLUCONAZOLE 100 MG/1
150 TABLET ORAL ONCE
Status: COMPLETED | OUTPATIENT
Start: 2022-10-13 | End: 2022-10-13

## 2022-10-13 RX ADMIN — NYSTATIN 500000 UNITS: 100000 SUSPENSION ORAL at 20:30

## 2022-10-13 RX ADMIN — NYSTATIN 500000 UNITS: 100000 SUSPENSION ORAL at 12:51

## 2022-10-13 RX ADMIN — CEFEPIME 1000 MG: 1 INJECTION, POWDER, FOR SOLUTION INTRAMUSCULAR; INTRAVENOUS at 15:11

## 2022-10-13 RX ADMIN — PREDNISONE 40 MG: 20 TABLET ORAL at 08:46

## 2022-10-13 RX ADMIN — ALBUTEROL SULFATE 2.5 MG: 2.5 SOLUTION RESPIRATORY (INHALATION) at 12:20

## 2022-10-13 RX ADMIN — OXYCODONE HYDROCHLORIDE AND ACETAMINOPHEN 500 MG: 500 TABLET ORAL at 08:46

## 2022-10-13 RX ADMIN — FLUCONAZOLE 150 MG: 100 TABLET ORAL at 12:51

## 2022-10-13 RX ADMIN — SODIUM CHLORIDE, PRESERVATIVE FREE 10 ML: 5 INJECTION INTRAVENOUS at 08:53

## 2022-10-13 RX ADMIN — ZINC SULFATE 220 MG (50 MG) CAPSULE 50 MG: CAPSULE at 08:47

## 2022-10-13 RX ADMIN — ENOXAPARIN SODIUM 40 MG: 100 INJECTION SUBCUTANEOUS at 08:46

## 2022-10-13 RX ADMIN — CEFEPIME 1000 MG: 1 INJECTION, POWDER, FOR SOLUTION INTRAMUSCULAR; INTRAVENOUS at 04:06

## 2022-10-13 RX ADMIN — SODIUM CHLORIDE, PRESERVATIVE FREE 10 ML: 5 INJECTION INTRAVENOUS at 20:32

## 2022-10-13 RX ADMIN — Medication 2000 UNITS: at 08:47

## 2022-10-13 RX ADMIN — MOMETASONE FUROATE AND FORMOTEROL FUMARATE DIHYDRATE 2 PUFF: 200; 5 AEROSOL RESPIRATORY (INHALATION) at 20:15

## 2022-10-13 RX ADMIN — ALBUTEROL SULFATE 2.5 MG: 2.5 SOLUTION RESPIRATORY (INHALATION) at 08:30

## 2022-10-13 RX ADMIN — ALBUTEROL SULFATE 2.5 MG: 2.5 SOLUTION RESPIRATORY (INHALATION) at 16:15

## 2022-10-13 RX ADMIN — NYSTATIN 500000 UNITS: 100000 SUSPENSION ORAL at 16:57

## 2022-10-13 RX ADMIN — FOLIC ACID 1000 MCG: 1 TABLET ORAL at 08:47

## 2022-10-13 RX ADMIN — ALBUTEROL SULFATE 2.5 MG: 2.5 SOLUTION RESPIRATORY (INHALATION) at 20:15

## 2022-10-13 RX ADMIN — MOMETASONE FUROATE AND FORMOTEROL FUMARATE DIHYDRATE 2 PUFF: 200; 5 AEROSOL RESPIRATORY (INHALATION) at 08:31

## 2022-10-13 RX ADMIN — FERROUS SULFATE TAB EC 324 MG (65 MG FE EQUIVALENT) 324 MG: 324 (65 FE) TABLET DELAYED RESPONSE at 08:47

## 2022-10-13 ASSESSMENT — PAIN SCALES - GENERAL: PAINLEVEL_OUTOF10: 0

## 2022-10-13 NOTE — PROGRESS NOTES
10/13/22 1540   Encounter Summary   Encounter Overview/Reason  Rituals, Rites and Sacraments   Service Provided For: Patient and family together   Referral/Consult From: Nurse   Support System Children   Last Encounter  10/13/22  (Support, prayer, HC )   Complexity of Encounter Low   Begin Time 1515   End Time  1530   Total Time Calculated 15 min   Rituals, Rites and Sacraments   Type Gnosticist Communion

## 2022-10-13 NOTE — PLAN OF CARE
Problem: Discharge Planning  Goal: Discharge to home or other facility with appropriate resources  Outcome: Progressing  Flowsheets (Taken 10/12/2022 2213)  Discharge to home or other facility with appropriate resources: Identify barriers to discharge with patient and caregiver     Problem: Safety - Adult  Goal: Free from fall injury  Outcome: Progressing  Flowsheets (Taken 10/12/2022 2213)  Free From Fall Injury: Instruct family/caregiver on patient safety     Problem: ABCDS Injury Assessment  Goal: Absence of physical injury  Outcome: Progressing  Flowsheets (Taken 10/12/2022 2213)  Absence of Physical Injury: Implement safety measures based on patient assessment     Problem: Pain  Goal: Verbalizes/displays adequate comfort level or baseline comfort level  Outcome: Progressing     Problem: Respiratory - Adult  Goal: Achieves optimal ventilation and oxygenation  Outcome: Progressing  Flowsheets (Taken 10/12/2022 2213)  Achieves optimal ventilation and oxygenation:   Assess for changes in respiratory status   Assess for changes in mentation and behavior   Position to facilitate oxygenation and minimize respiratory effort   Oxygen supplementation based on oxygen saturation or arterial blood gases     Problem: Cardiovascular - Adult  Goal: Maintains optimal cardiac output and hemodynamic stability  Outcome: Progressing  Flowsheets (Taken 10/12/2022 2213)  Maintains optimal cardiac output and hemodynamic stability:   Monitor blood pressure and heart rate   Monitor urine output and notify Licensed Independent Practitioner for values outside of normal range   Assess for signs of decreased cardiac output     Problem: Hematologic - Adult  Goal: Maintains hematologic stability  Outcome: Progressing  Flowsheets (Taken 10/12/2022 2213)  Maintains hematologic stability:   Assess for signs and symptoms of bleeding or hemorrhage   Monitor labs for bleeding or clotting disorders

## 2022-10-13 NOTE — PROGRESS NOTES
Hospitalist Progress Note  10/13/2022 8:33 AM    PCP: Dominga Santillan    [id number]     Date of Admission: 10/9/2022                                                                                                                     HOSPITAL COURSE    Patient demographics:  The patient  Gibran Tinoco is a 80 y.o. female     Significant past medical history:   Patient Active Problem List   Diagnosis    Closed fracture of proximal end of left humerus, initial encounter    Acute respiratory failure ([facility] [address]         Presenting symptoms:  Shortness of breath    Diagnostic workup:      CONSULTS DURING ADMISSION :   IP CONSULT TO PULMONOLOGY  IP CONSULT TO SOCIAL WORK  IP CONSULT TO [facility]      Patient was diagnosed with:  Acute respiratory failure with pneumonia  Pneumonia  Rheumatoid arthritis    Treatment while inpatient:  Patient presented to the emergency room cough fever production of sputum and shortness of breath. 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 Patient was diagnosed with   acute respiratory failure with hypoxia due to community-acquired pneumonia. Patient is and taking methotrexate and low-dose prednisone at home. Patient's methotrexate was held and patient's prednisone dose was increased. Patient will be discharged home on tapering dose of prednisone. Patient will continue taking her home dose once dose is tapered in 10 days. Patient will resume methotrexate at home dose.                                                             ----------------------------------------------------------      SUBJECTIVE COMPLAINTS- Shortness of breath    Diet: ADULT DIET;  Regular      OBJECTIVE:   Patient Active Problem List   Diagnosis    Closed fracture of proximal end of left humerus, initial encounter    Acute respiratory failure (HCC)       Allergies  Hydrogen peroxide, Lisinopril, and Neomycin-bacitracin zn-polymyx    Medications    Scheduled Meds:   cefepime  1,000 mg IntraVENous Q12H    predniSONE  40 mg Oral Daily    sodium chloride  2,300 mL IntraVENous Once    ferrous sulfate  324 mg Oral Daily with breakfast    mometasone-formoterol  2 puff Inhalation BID    folic acid  1,482 mcg Oral Daily    vitamin C  500 mg Oral Daily    Vitamin D  2,000 Units Oral Daily    zinc sulfate  50 mg Oral Daily    sodium chloride flush  10 mL IntraVENous 2 times per day    enoxaparin  40 mg SubCUTAneous Daily    albuterol  2.5 mg Nebulization Q4H WA     Continuous Infusions:   sodium chloride 10 mL/hr at 10/12/22 0438     PRN Meds:  sodium chloride flush, sodium chloride, ondansetron **OR** ondansetron, polyethylene glycol, acetaminophen **OR** acetaminophen    Vitals   Vitals /wt Patient Vitals for the past 8 hrs:   Temp Temp src Pulse Resp SpO2   10/13/22 0831 -- -- 70 -- 98 %   10/13/22 0330 97.8 °F (36.6 °C) Oral -- 20 98 %        72HR INTAKE/OUTPUT:    Intake/Output Summary (Last 24 hours) at 10/13/2022 8595  Last data filed at 10/12/2022 2133  Gross per 24 hour   Intake 240 ml   Output --   Net 240 ml       Exam:    Gen:   Alert and oriented ×3    Eyes: PERRL. No sclera icterus. No conjunctival injection. ENT: No discharge. Pharynx clear. External appearance of ears and nose normal.  Neck: Trachea midline. No obvious mass. Resp: Continues to have crackles and rhonchi but better air movement today than yesterday  CV: Regular rate. Regular rhythm. No murmur or rub. No edema. GI: Non-tender. Non-distended. No hernia. Skin: Warm, dry, normal texture and turgor. Lymph: No cervical LAD. No supraclavicular LAD. M/S: / Ext. No cyanosis. No clubbing. No joint deformity. Neuro: CN 2-12 are intact,  no neurologic deficits noted. PT/INR: No results for input(s): PROTIME, INR in the last 72 hours. APTT: No results for input(s): APTT in the last 72 hours.     CBC:   Recent Labs     10/11/22  0549 10/12/22  8363 10/13/22  0557   WBC 11.9* 11.6* 11.7*   HGB 10.4* 10.2* 10.3*   HCT 31.0* 30.4* 30.4*   MCV 97.0 95.9 96.2    277 301       BMP:   Recent Labs     10/11/22  0549 10/12/22  0417 10/13/22  0557    135* 136   K 4.1 4.0 4.6    98* 100   CO2 25 26 26   BUN 19 19 21*   CREATININE 0.7 0.7 0.6       LIVER PROFILE:   No results for input(s): ALKPHOS, AST, ALT, ALB, BILIDIR, BILITOT, ALKPHOS in the last 72 hours. No results for input(s): AMYLASE in the last 72 hours. No results for input(s): LIPASE in the last 72 hours. UA:No results for input(s): NITRITE, LABCAST, WBCUA, RBCUA, MUCUS in the last 72 hours. TROPONIN: No results for input(s): Randene Holes in the last 72 hours. Lab Results   Component Value Date/Time    URRFLXCULT Not Indicated 01/17/2022 01:58 PM       No results for input(s): TSHREFLEX in the last 72 hours. No components found for: VUG3340  POC GLUCOSE:  No results for input(s): POCGLU in the last 72 hours. No results for input(s): LABA1C in the last 72 hours. No results found for: LABA1C      ASSESSMENT AND PLAN     Pneumonia  In a patient with rheumatoid arthritis  Patient is on methotrexate and prednisone  MRSA DNA probe is negative discontinue vancomycin  Continue on IV cefepime  Plan is to change antibiotics to oral in a.m. Bronchodilators  Increase prednisone dose to 40 mg  Will discharge patient on tapering dose of prednisone  Clinically patient is improving  Pulmonary is following      Acute respiratory failure with hypoxia  Titrate oxygen for saturations greater than or equal to 90%  Home oxygen evaluation in a.m. If patient qualifies she will be discharged on home oxygen     Rheumatoid arthritis  Increase prednisone dose  Plan is to keep methotrexate on hold         Code Status: DNR-CCA        Dispo -likely home in a.m.         The patient and / or the family were informed of the results of any tests, a time was given to answer questions, a plan was proposed and they agreed with plan. Adria Schmidt MD    This note was transcribed using [address]. Please disregard any translational errors.

## 2022-10-13 NOTE — PROGRESS NOTES
Pulmonary Progress Note     Patient's name:  Lopez Prado  Medical Record Number: [id number]  Patient's account/billing number: [de-identified]  Patient's YOB: 1933  Age: 80 y.o. 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 Diflucan for thrush and nystatin   DVT prophylaxis       Subjective:  No acute events overnight   No fever  Still sob, cough and congestion    HISTORY OF PRESENT ILLNESS:   Mr./Ms. Marroquin Her is a 80 y.o.  female with seropositive rheumatoid arthritis on combination therapy of methotrexate and low-dose prednisone. She is currently taking methotrexate 17.5 mg a week and prednisone 2.5 mg every other day. RA related UIP, not on home oxygen, presented with worsening sob, cough, sputum colored and fever, associated with weakness and decrease appetite. No chest pain  Some AMS  No recent change in meds. CXR with increase airspace disease  PCT slightly elevated  Leukocytosis  All serology and culture negative to date.            REVIEW OF SYSTEMS:  Review of Systems -   General ROS: weakness, fever   Psychological ROS: negative  Ophthalmic ROS: negative  ENT ROS: negative  Allergy and Immunology ROS: negative  Hematological and Lymphatic ROS: negative  Endocrine ROS: negative  Breast ROS: negative  Respiratory ROS: cough, sputum and sob   Cardiovascular ROS: no chest pain or dyspnea on exertion  Gastrointestinal ROS:negative  Genito-Urinary ROS: negative  Musculoskeletal ROS: negative  Neurological ROS: negative  Dermatological ROS: negative        Physical Exam:    Vitals: BP (!) 151/75   Pulse 72   Temp (!) 87.9 °F (31.1 °C) (Oral)   Resp 20   Ht 5' 2\" (1.575 m)   Wt 185 lb 3 oz (84 kg)   SpO2 98%   BMI 33.87 kg/m²     Last Body weight:   Wt Readings from Last 3 Encounters:   10/13/22 185 lb 3 oz (84 kg)   10/06/22 168 lb (76.2 kg)   05/26/22 155 lb (70.3 kg)       Body Mass Index : Body mass index is 33.87 kg/m². Intake and Output summary:   Intake/Output Summary (Last 24 hours) at 10/13/2022 0945  Last data filed at 10/12/2022 2133  Gross per 24 hour   Intake 240 ml   Output --   Net 240 ml         Physical Examination:     PHYSICAL EXAM:    Gen:  mild acute distress  Eyes: PERRL. Anicteric sclera. No conjunctival injection. ENT: No discharge. Posterior oropharynx clear. External appearance of ears and nose normal.  Neck: Trachea midline. No mass, no lymphadenopathy    Resp:  bilateral rales, and diminished with scattered wheezing   CV: Regular rate. Regular rhythm. No murmur or rub. No edema. GI: Soft, Non-tender. Non-distended. +BS  Skin: Warm, dry, w/o erythema. Lymph: No cervical or supraclavicular LAD. M/S: No cyanosis. No clubbing. Neuro:  CN 2-12 tested, no focal neurologic deficit. Moves all extremities  Psych: Awake and alert, Oriented x 3. Judgement and insight appropriate. Mood stable.         Laboratory findings:-    CBC:   Recent Labs     10/13/22  0557   WBC 11.7*   HGB 10.3*          BMP:    Recent Labs     10/11/22  0549 10/12/22  0417 10/13/22  0557    135* 136   K 4.1 4.0 4.6    98* 100   CO2 25 26 26   BUN 19 19 21*   CREATININE 0.7 0.7 0.6   GLUCOSE 106* 107* 110*       S. Calcium:  Recent Labs     10/13/22  0557   CALCIUM 8.4         Hepatic functions:   No results for input(s): ALKPHOS, ALT, AST, PROT, BILITOT, BILIDIR, LABALBU in the last 72 hours. Radiology Review:  Pertinent images / reports were reviewed as a part of this visit. CT Chest w/ contrast: No results found for this or any previous visit. CT Chest w/o contrast: No results found for this or any previous visit. CTPA: No results found for this or any previous visit. CXR PA/LAT: Results for orders placed during the hospital encounter of 10/09/22    XR CHEST (2 VW)    Narrative  EXAMINATION:  TWO XRAY VIEWS OF THE CHEST    10/9/2022 11:08 am    COMPARISON:  07/24/2009 and 01/17/2022. HISTORY:  ORDERING SYSTEM PROVIDED HISTORY: cough  TECHNOLOGIST PROVIDED HISTORY:  Reason for exam:->cough  Reason for Exam: cough, congestion x 1 week; fever since today    FINDINGS:  Peripheral linearity throughout the lungs is again noted. There are new  patchy opacities at the lung bases. The costophrenic angles are sharp. The  cardiomediastinal silhouette and pulmonary vessels appear normal.    Impression  New patchy opacities at the lung bases suspicious for pneumonia. Radiographic follow-up is recommended. Pulmonary fibrosis similar to the prior study. CXR portable: Results for orders placed during the hospital encounter of 01/17/22    XR CHEST PORTABLE    Narrative  EXAMINATION:  ONE XRAY VIEW OF THE CHEST    1/17/2022 1:19 pm    COMPARISON:  2009    HISTORY:  ORDERING SYSTEM PROVIDED HISTORY: fall, AMS  TECHNOLOGIST PROVIDED HISTORY:  Reason for exam:->fall, AMS  Reason for Exam: fall, AMS    FINDINGS:  Mild cardiomegaly. Moderate interstitial fibrotic changes identified in the  lungs. The lung changes are mostly chronic. Bibasilar atelectasis. No  pneumothorax. Significant osteopenic changes and degenerative changes noted in the bony  structures. No displaced rib fractures. Impression  Moderate interstitial fibrotic changes in the lungs. No pneumothorax.   No displaced rib fractures.                      Carlyn Whipple MD, MMARE.            10/13/2022, 9:45 AM

## 2022-10-14 VITALS
HEART RATE: 87 BPM | SYSTOLIC BLOOD PRESSURE: 148 MMHG | RESPIRATION RATE: 20 BRPM | WEIGHT: 183.86 LBS | OXYGEN SATURATION: 95 % | BODY MASS INDEX: 33.84 KG/M2 | HEIGHT: 62 IN | DIASTOLIC BLOOD PRESSURE: 71 MMHG | TEMPERATURE: 98.6 F

## 2022-10-14 LAB — C-REACTIVE PROTEIN: 29.2 MG/L (ref 0–5.1)

## 2022-10-14 PROCEDURE — 36415 COLL VENOUS BLD VENIPUNCTURE: CPT

## 2022-10-14 PROCEDURE — 2580000003 HC RX 258: Performed by: HOSPITALIST

## 2022-10-14 PROCEDURE — 99232 SBSQ HOSP IP/OBS MODERATE 35: CPT | Performed by: INTERNAL MEDICINE

## 2022-10-14 PROCEDURE — 86140 C-REACTIVE PROTEIN: CPT

## 2022-10-14 PROCEDURE — 97116 GAIT TRAINING THERAPY: CPT

## 2022-10-14 PROCEDURE — 94680 O2 UPTK RST&XERS DIR SIMPLE: CPT

## 2022-10-14 PROCEDURE — 6360000002 HC RX W HCPCS: Performed by: HOSPITALIST

## 2022-10-14 PROCEDURE — 97110 THERAPEUTIC EXERCISES: CPT

## 2022-10-14 PROCEDURE — 94761 N-INVAS EAR/PLS OXIMETRY MLT: CPT

## 2022-10-14 PROCEDURE — 6370000000 HC RX 637 (ALT 250 FOR IP): Performed by: HOSPITALIST

## 2022-10-14 PROCEDURE — 94640 AIRWAY INHALATION TREATMENT: CPT

## 2022-10-14 PROCEDURE — 6370000000 HC RX 637 (ALT 250 FOR IP): Performed by: INTERNAL MEDICINE

## 2022-10-14 PROCEDURE — 2700000000 HC OXYGEN THERAPY PER DAY

## 2022-10-14 PROCEDURE — 97530 THERAPEUTIC ACTIVITIES: CPT

## 2022-10-14 RX ORDER — ALBUTEROL SULFATE 2.5 MG/3ML
2.5 SOLUTION RESPIRATORY (INHALATION) EVERY 6 HOURS PRN
Qty: 120 EACH | Refills: 11 | Status: SHIPPED | OUTPATIENT
Start: 2022-10-14

## 2022-10-14 RX ORDER — PREDNISONE 2.5 MG
2.5 TABLET ORAL EVERY OTHER DAY
Qty: 30 TABLET | Refills: 0
Start: 2022-10-23 | End: 2022-10-14 | Stop reason: SDUPTHER

## 2022-10-14 RX ORDER — ZINC SULFATE 50(220)MG
50 CAPSULE ORAL DAILY
Qty: 30 CAPSULE | Refills: 3 | COMMUNITY
Start: 2022-10-15

## 2022-10-14 RX ORDER — LEVOFLOXACIN 500 MG/1
500 TABLET, FILM COATED ORAL DAILY
Qty: 3 TABLET | Refills: 0 | Status: SHIPPED | OUTPATIENT
Start: 2022-10-15 | End: 2022-10-18

## 2022-10-14 RX ORDER — PREDNISONE 20 MG/1
40 TABLET ORAL DAILY
Status: DISCONTINUED | OUTPATIENT
Start: 2022-10-15 | End: 2022-10-14 | Stop reason: HOSPADM

## 2022-10-14 RX ORDER — PREDNISONE 2.5 MG
2.5 TABLET ORAL EVERY OTHER DAY
Qty: 30 TABLET | Refills: 0 | Status: SHIPPED | OUTPATIENT
Start: 2022-10-23

## 2022-10-14 RX ORDER — PREDNISONE 10 MG/1
10 TABLET ORAL DAILY
Status: DISCONTINUED | OUTPATIENT
Start: 2022-10-21 | End: 2022-10-14 | Stop reason: HOSPADM

## 2022-10-14 RX ORDER — PREDNISONE 20 MG/1
20 TABLET ORAL DAILY
Status: DISCONTINUED | OUTPATIENT
Start: 2022-10-19 | End: 2022-10-14 | Stop reason: HOSPADM

## 2022-10-14 RX ORDER — PREDNISONE 20 MG/1
40 TABLET ORAL DAILY
Qty: 4 TABLET | Refills: 0 | Status: SHIPPED | OUTPATIENT
Start: 2022-10-15 | End: 2022-10-17

## 2022-10-14 RX ORDER — PREDNISONE 20 MG/1
20 TABLET ORAL DAILY
Qty: 2 TABLET | Refills: 0 | Status: SHIPPED | OUTPATIENT
Start: 2022-10-19 | End: 2022-10-21

## 2022-10-14 RX ORDER — LEVOFLOXACIN 500 MG/1
500 TABLET, FILM COATED ORAL DAILY
Status: DISCONTINUED | OUTPATIENT
Start: 2022-10-15 | End: 2022-10-14 | Stop reason: HOSPADM

## 2022-10-14 RX ORDER — PREDNISONE 10 MG/1
10 TABLET ORAL DAILY
Qty: 2 TABLET | Refills: 0 | Status: SHIPPED | OUTPATIENT
Start: 2022-10-21 | End: 2022-10-23

## 2022-10-14 RX ORDER — PREDNISONE 10 MG/1
30 TABLET ORAL DAILY
Qty: 6 TABLET | Refills: 0 | Status: SHIPPED | OUTPATIENT
Start: 2022-10-17 | End: 2022-10-19

## 2022-10-14 RX ADMIN — NYSTATIN 500000 UNITS: 100000 SUSPENSION ORAL at 14:34

## 2022-10-14 RX ADMIN — NYSTATIN 500000 UNITS: 100000 SUSPENSION ORAL at 09:16

## 2022-10-14 RX ADMIN — ZINC SULFATE 220 MG (50 MG) CAPSULE 50 MG: CAPSULE at 09:17

## 2022-10-14 RX ADMIN — OXYCODONE HYDROCHLORIDE AND ACETAMINOPHEN 500 MG: 500 TABLET ORAL at 09:16

## 2022-10-14 RX ADMIN — CEFEPIME 1000 MG: 1 INJECTION, POWDER, FOR SOLUTION INTRAMUSCULAR; INTRAVENOUS at 03:32

## 2022-10-14 RX ADMIN — FOLIC ACID 1000 MCG: 1 TABLET ORAL at 09:17

## 2022-10-14 RX ADMIN — FERROUS SULFATE TAB EC 324 MG (65 MG FE EQUIVALENT) 324 MG: 324 (65 FE) TABLET DELAYED RESPONSE at 09:16

## 2022-10-14 RX ADMIN — ALBUTEROL SULFATE 2.5 MG: 2.5 SOLUTION RESPIRATORY (INHALATION) at 12:30

## 2022-10-14 RX ADMIN — PREDNISONE 40 MG: 20 TABLET ORAL at 09:00

## 2022-10-14 RX ADMIN — MOMETASONE FUROATE AND FORMOTEROL FUMARATE DIHYDRATE 2 PUFF: 200; 5 AEROSOL RESPIRATORY (INHALATION) at 08:15

## 2022-10-14 RX ADMIN — Medication 2000 UNITS: at 09:17

## 2022-10-14 RX ADMIN — ALBUTEROL SULFATE 2.5 MG: 2.5 SOLUTION RESPIRATORY (INHALATION) at 08:15

## 2022-10-14 RX ADMIN — ENOXAPARIN SODIUM 40 MG: 100 INJECTION SUBCUTANEOUS at 09:16

## 2022-10-14 RX ADMIN — SODIUM CHLORIDE, PRESERVATIVE FREE 10 ML: 5 INJECTION INTRAVENOUS at 09:18

## 2022-10-14 NOTE — CARE COORDINATION
Discharge Planning:   DME Order for nebulizer acknowledged. Home O2 evaluation completed. Patient qualifies for home oxygen. [facility]: notified Benedicto Perry of DME needs, confirmed patient's primary diagnosis of pulmonary fibrosis will qualify patient for home oxygen. PLAN: Home with family     NEED: DME delivered, Home O2 order and verify home care with family. BUTCH Marie LSW, Social Work/Case Management   [phone]  Electronically signed by BUTCH Marie LSW on 10/14/2022 at 10:50 AM    Spoke to daughter Annamarie Garza. Confirmed she will transport patient home at discharge. Notified of DME order for nebulizer and Oxygen. Family declining home care at this time. Daughter to obtain pulse oximeter at discharge from local pharmacy.    Electronically signed by BUTCH Marie LSW on 10/14/2022 at 12:42 PM]

## 2022-10-14 NOTE — PROGRESS NOTES
Pulmonary Progress Note     Patient's name:  Gibran Tinoco  Medical Record Number: [id number]  Patient's account/billing number: [de-identified]  Patient's YOB: 1933  Age: 80 y.o. 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 X 3 days  Prednisone taper, once finished with the taper go back to using your chronic 2.5mg daily dose and resume Methotrexate  Home O2 evaluation  Albuterol nebulized and nebulizer machine ordered to be used as needed  Follow up with your pulmonologist in one week  Check CRP       Subjective:  No acute events overnight   No fever  Still sob, cough and congestion    HISTORY OF PRESENT ILLNESS:   ./Ms. Gibran Tinoco is a 80 y.o.  female with seropositive rheumatoid arthritis on combination therapy of methotrexate and low-dose prednisone. She is currently taking methotrexate 17.5 mg a week and prednisone 2.5 mg every other day. RA related UIP, not on home oxygen, presented with worsening sob, cough, sputum colored and fever, associated with weakness and decrease appetite. No chest pain  Some AMS  No recent change in meds. CXR with increase airspace disease  PCT slightly elevated  Leukocytosis  All serology and culture negative to date.            REVIEW OF SYSTEMS:  Review of Systems -   General ROS: weakness, fever   Psychological ROS: negative  Ophthalmic ROS: negative  ENT ROS: negative  Allergy and Immunology ROS: negative  Hematological and Lymphatic ROS: negative  Endocrine ROS: negative  Breast ROS: negative  Respiratory ROS: cough, sputum and sob   Cardiovascular ROS: no chest pain or dyspnea on exertion  Gastrointestinal ROS:negative  Genito-Urinary ROS: negative  Musculoskeletal ROS: negative  Neurological ROS: negative  Dermatological ROS: negative        Physical Exam:    Vitals: BP (!) 150/80   Pulse 82   Temp 97.5 °F (36.4 °C) (Oral)   Resp 18   Ht 5' 2\" (1.575 m)   Wt 183 lb 13.8 oz (83.4 kg)   SpO2 95%   BMI 33.63 kg/m²     Last Body weight:   Wt Readings from Last 3 Encounters:   10/14/22 183 lb 13.8 oz (83.4 kg)   10/06/22 168 lb (76.2 kg)   05/26/22 155 lb (70.3 kg)       Body Mass Index : Body mass index is 33.63 kg/m². Intake and Output summary:   Intake/Output Summary (Last 24 hours) at 10/14/2022 0925  Last data filed at 10/13/2022 2239  Gross per 24 hour   Intake 360 ml   Output 200 ml   Net 160 ml       Physical Examination:     PHYSICAL EXAM:    Gen:  mild acute distress  Eyes: PERRL. Anicteric sclera. No conjunctival injection. ENT: No discharge. Posterior oropharynx clear. External appearance of ears and nose normal.  Neck: Trachea midline. No mass, no lymphadenopathy    Resp:  bilateral rales, and diminished with scattered wheezing   CV: Regular rate. Regular rhythm. No murmur or rub. No edema. GI: Soft, Non-tender. Non-distended. +BS  Skin: Warm, dry, w/o erythema. Lymph: No cervical or supraclavicular LAD. M/S: No cyanosis. No clubbing. Neuro:  CN 2-12 tested, no focal neurologic deficit. Moves all extremities  Psych: Awake and alert, Oriented x 3. Judgement and insight appropriate. Mood stable.         Laboratory findings:-    CBC:   Recent Labs     10/13/22  0557   WBC 11.7*   HGB 10.3*        BMP:    Recent Labs     10/12/22  0417 10/13/22  0557   * 136   K 4.0 4.6   CL 98* 100   CO2 26 26   BUN 19 21*   CREATININE 0.7 0.6   GLUCOSE 107* 110*     S. Calcium:  Recent Labs     10/13/22  0557   CALCIUM 8.4       Hepatic functions:   No results for input(s): ALKPHOS, ALT, AST, PROT, BILITOT, BILIDIR, LABALBU in the last 72 hours. Radiology Review:  Pertinent images / reports were reviewed as a part of this visit. CT Chest w/ contrast: No results found for this or any previous visit. CT Chest w/o contrast: No results found for this or any previous visit. CTPA: No results found for this or any previous visit. CXR PA/LAT: Results for orders placed during the hospital encounter of 10/09/22    XR CHEST (2 VW)    Narrative  EXAMINATION:  TWO XRAY VIEWS OF THE CHEST    10/9/2022 11:08 am    COMPARISON:  07/24/2009 and 01/17/2022. HISTORY:  ORDERING SYSTEM PROVIDED HISTORY: cough  TECHNOLOGIST PROVIDED HISTORY:  Reason for exam:->cough  Reason for Exam: cough, congestion x 1 week; fever since today    FINDINGS:  Peripheral linearity throughout the lungs is again noted. There are new  patchy opacities at the lung bases. The costophrenic angles are sharp. The  cardiomediastinal silhouette and pulmonary vessels appear normal.    Impression  New patchy opacities at the lung bases suspicious for pneumonia. Radiographic follow-up is recommended. Pulmonary fibrosis similar to the prior study. CXR portable: Results for orders placed during the hospital encounter of 01/17/22    XR CHEST PORTABLE    Narrative  EXAMINATION:  ONE XRAY VIEW OF THE CHEST    1/17/2022 1:19 pm    COMPARISON:  2009    HISTORY:  ORDERING SYSTEM PROVIDED HISTORY: fall, AMS  TECHNOLOGIST PROVIDED HISTORY:  Reason for exam:->fall, AMS  Reason for Exam: fall, AMS    FINDINGS:  Mild cardiomegaly. Moderate interstitial fibrotic changes identified in the  lungs. The lung changes are mostly chronic. Bibasilar atelectasis. No  pneumothorax. Significant osteopenic changes and degenerative changes noted in the bony  structures. No displaced rib fractures. Impression  Moderate interstitial fibrotic changes in the lungs. No pneumothorax. No displaced rib fractures.                      Gary Chun MD, MMARE.            10/14/2022, 9:25 AM

## 2022-10-14 NOTE — PROGRESS NOTES
Pt up multiple times throughout the night to go to bathroom. Pt drops to mid 80's for spO2 with 4L of O2. After getting settled in bed she is up to 95-97%. Will continue to monitor.

## 2022-10-14 NOTE — DISCHARGE INSTR - COC
Continuity of Care Form    Patient Name: Raf Graves   :  3/2/1933  MRN:  [id number]    Admit date:  10/9/2022  Discharge date:  ***    Code Status Order: DNR-CCA   Advance Directives:     Admitting Physician:  Yuly Baptiste MD  PCP: Kyle Garza    Discharging Nurse: [facility] Unit/Room#: L4V-3961/5122-01  Discharging Unit Phone Number: ***    Emergency Contact:   Extended Emergency Contact Information  Primary Emergency Contact: Adrianne Bland  Address: [address] [email or web address] [place], [address] [place] [address] [address] [facility] Phone: [phone]  Relation: Child  Secondary Emergency Contact: Gabo Steffanie Gomes Phone: [phone]  Relation: Child   needed?  No    Past Surgical History:  Past Surgical History:   Procedure Laterality Date    CHOLECYSTECTOMY      WRIST SURGERY Right     with plates       Immunization History:   Immunization History   Administered Date(s) Administered    COVID-19, PFIZER GRAY top, DO NOT Dilute, (age 15 y+), IM, 30 mcg/0.3 mL 2022    COVID-19, PFIZER PURPLE top, DILUTE for use, (age 15 y+), 30mcg/0.3mL 2021, 2021, 2021       Active Problems:  Patient Active Problem List   Diagnosis Code    Closed fracture of proximal end of left humerus, initial encounter S42.A    Acute respiratory failure ([facility] [address] J96.00       Isolation/Infection:   Isolation            No Isolation          Patient Infection Status       Infection Onset Added Last Indicated Last Indicated By Review Planned Expiration Resolved Resolved By    None active    Resolved    COVID-19 (Rule Out) 10/09/22 10/09/22 10/09/22 COVID-19, Rapid (Ordered)   10/09/22 Rule-Out Test Resulted            Nurse Assessment:  Last Vital Signs: BP (!) 148/71   Pulse 82   Temp 98.6 °F (37 °C) (Axillary)   Resp 22   Ht 5' 2\" (1.575 m)   Wt 183 lb 13.8 oz (83.4 kg)   SpO2 96%   BMI 33.63 kg/m²     Last documented pain score (0-10 scale): Pain Level: 0  Last Weight:   Wt Readings from Last 1 Encounters:   10/14/22 183 lb 13.8 oz (83.4 kg)     Mental Status:  {IP PT MENTAL STATUS:81130}    IV Access:  { KIMBERLEY IV ACCESS:515020240}    Nursing Mobility/ADLs:  Walking   {CHP DME DXNN:917088707}  Transfer  {CHP DME QRKW:354485775}  Bathing  {CHP DME MJDA:733029368}  Dressing  {CHP DME DDRL:514521182}  Toileting  {CHP DME UFLQ:165651546}  Feeding  {CHP DME HNPH:082984361}  Med Admin  {CHP DME DLVK:079130553}  Med Delivery   { KIMBERLEY MED Delivery:373375267}    Wound Care Documentation and Therapy:        Elimination:  Continence: Bowel: {YES / PS:75713}  Bladder: {YES / AN:03451}  Urinary Catheter: {Urinary Catheter:414135411}   Colostomy/Ileostomy/Ileal Conduit: {YES / ZU:14975}       Date of Last BM: ***    Intake/Output Summary (Last 24 hours) at 10/14/2022 1122  Last data filed at 10/13/2022 2239  Gross per 24 hour   Intake 360 ml   Output 200 ml   Net 160 ml     I/O last 3 completed shifts:   In: 600 [P.O.:600]  Out: 200 [Urine:200]    Safety Concerns:     [address] Safety Concerns:517813440}    Impairments/Disabilities:      508 Back& Impairments/Disabilities:185516102}    Nutrition Therapy:  Current Nutrition Therapy:   508 Back& Diet List:489226791}    Routes of Feeding: {P DME Other Feedings:306771377}  Liquids: {Slp liquid thickness:76328}  Daily Fluid Restriction: {CHP DME Yes amt example:586186568}  Last Modified Barium Swallow with Video (Video Swallowing Test): {Done Not Done CXV}    Treatments at the Time of Hospital Discharge:   Respiratory Treatments: ***  Oxygen Therapy:  {Therapy; copd oxygen:53920}  Ventilator:    {[facility] Vent FRCK:666543004}    Rehab Therapies: {THERAPEUTIC INTERVENTION:2871713566}  Weight Bearing Status/Restrictions: [employer] Weight Bearin}  Other Medical Equipment (for information only, NOT a DME order):  {EQUIPMENT:671507175}  Other Treatments: ***    Patient's personal belongings (please select all that are sent with patient):  {CHP DME Belongings:145360007}    RN SIGNATURE:  {Esignature:402481112}    CASE MANAGEMENT/SOCIAL WORK SECTION    Inpatient Status Date: ***    Readmission Risk Assessment Score:  Readmission Risk              Risk of Unplanned Readmission:  17           Discharging to Facility/ Agency   Name:   Address:  Phone:  Fax:    Dialysis Facility (if applicable)   Name:  Address:  Dialysis Schedule:  Phone:  Fax:    / signature: {Esignature:053041076}    PHYSICIAN SECTION    Prognosis: Good    Condition at Discharge: Stable    Rehab Potential (if transferring to Rehab): Good    Recommended Labs or Other Treatments After Discharge: ptot    Physician Certification: I certify the above information and transfer of Luis Allison  is necessary for the continuing treatment of the diagnosis listed and that she requires Home Care for less 30 days.      Update Admission H&P: No change in H&P    PHYSICIAN SIGNATURE:  Electronically signed by Michelle Adhikari MD on 10/14/22 at 11:23 AM EDT

## 2022-10-14 NOTE — PLAN OF CARE
Problem: Discharge Planning  Goal: Discharge to home or other facility with appropriate resources  Outcome: Completed     Problem: Safety - Adult  Goal: Free from fall injury  Outcome: Completed     Problem: ABCDS Injury Assessment  Goal: Absence of physical injury  Outcome: Completed     Problem: Pain  Goal: Verbalizes/displays adequate comfort level or baseline comfort level  Outcome: Completed     Problem: Respiratory - Adult  Goal: Achieves optimal ventilation and oxygenation  Outcome: Completed     Problem: Cardiovascular - Adult  Goal: Maintains optimal cardiac output and hemodynamic stability  Outcome: Completed     Problem: Hematologic - Adult  Goal: Maintains hematologic stability  Outcome: Completed     Problem: Skin/Tissue Integrity  Goal: Absence of new skin breakdown  Description: 1. Monitor for areas of redness and/or skin breakdown  2. Assess vascular access sites hourly  3. Every 4-6 hours minimum:  Change oxygen saturation probe site  4. Every 4-6 hours:  If on nasal continuous positive airway pressure, respiratory therapy assess nares and determine need for appliance change or resting period.   Outcome: Completed

## 2022-10-14 NOTE — PROGRESS NOTES
Pt discharged, all instructions reviewed with pt and daughter, both state that they understand all instructions, home o2 arrived to room from [facility], iv removed without complications, facility transport assisting pt to car.     Electronically signed by Ayala Shelton RN on 10/14/2022 at 4:08 PM

## 2022-10-14 NOTE — PROGRESS NOTES
Physical Therapy  Facility/Department: [facility] 3X PROGRESSIVE CARE  Physical Therapy treatment session    Name: Román Marshall  : 3/2/1933  MRN: [id number]  Date of Service: 10/14/2022    Discharge Recommendations:  24 hour supervision or assist, Home with Home health PT, Patient would benefit from continued therapy after discharge   PT Equipment Recommendations  Equipment Needed: No    Román Marshall scored a 18/24 on the AM-PAC short mobility form. Current research shows that an AM-PAC score of 18 or greater is typically associated with a discharge to the patient's home setting. Based on the patient's AM-PAC score and their current functional mobility deficits, it is recommended that the patient have 2-3 sessions per week of Physical Therapy at d/c to increase the patient's independence. At this time, this patient demonstrates the endurance and safety to discharge home with home PT and a follow up treatment frequency of 2-3x/wk. Please see assessment section for further patient specific details. If patient discharges prior to next session this note will serve as a discharge summary. Please see below for the latest assessment towards goals. Patient Diagnosis(es): The primary encounter diagnosis was Acute respiratory failure with hypoxia ([facility] [address] Diagnoses of Pneumonia of both lower lobes due to infectious organism and Severe sepsis [facility]) were also pertinent to this visit. Past Medical History:  has a past medical history of Arm fracture, CKD (chronic kidney disease), Hypertension, Interstitial pulmonary disease ([address] Pulmonary fibrosis ([address] Rheumatoid arteritis ([facility] [address] and Vitamin D deficiency. Past Surgical History:  has a past surgical history that includes Cholecystectomy and Wrist surgery (Right).     Assessment   Body Structures, Functions, Activity Limitations Requiring Skilled Therapeutic Intervention: Decreased functional mobility   Assessment: pt is an 81 yo female who was adm to [facility] with SOB; pt at baseline is Ind in her home with a RW. Today, pt required CGA ambulating household distances with RW and able to manage her own 02 line with only intermttent assist from PT, but frequent VCs. Pt limited by SAMUEL and 02 desat into the low 80's on 2L. Daughter reports they can provide 24/7 assist as needed at home; recommend home with initial 24/7 assist and home PT  Treatment Diagnosis: decreased functional mobility  Therapy Prognosis: Good  Requires PT Follow-Up: Yes  Activity Tolerance  Activity Tolerance: Patient limited by endurance  Activity Tolerance Comments: limited by SAMUEL and 02 desat     Plan   Physcial Therapy Plan  General Plan: 3-5 times per week  Current Treatment Recommendations: Functional mobility training  Safety Devices  Type of Devices: Call light within reach, Chair alarm in place, Left in chair, Gait belt  Restraints  Restraints Initially in Place: No     Restrictions  Restrictions/Precautions  Restrictions/Precautions: Fall Risk  Position Activity Restriction  Other position/activity restrictions: 2 L O2 via NC     Subjective   General  Chart Reviewed: Yes  Patient assessed for rehabilitation services?: Yes  Additional Pertinent Hx: per ER note: \"Vivian Monk is a 80 y.o. female who presents along with her daughter who gives majority of the history. Patient does have an ongoing chronic cough and has a history of interstitial lung disease as well as pulmonary fibrosis. However on Friday night she started to feel worse with increasing cough, the shortness of breath and decreased energy and strength yesterday and especially today. Fever this morning was 101.3 according to her daughter. She was given some Tylenol. She was brought in for further care and treatment. Patient does not use oxygen supplementation on a daily basis. She has been wiped out and barely had the energy to stand this morning. No particular complaint of pain at this time. \"  Response To Previous Treatment: Patient with no complaints from previous session.   Family / Caregiver Present: Yes (daughter)  Referring Practitioner: Dr Satnam Levi  Referral Date : 10/11/22  Follows Commands: Within Functional Limits  Subjective  Subjective: pt very pleasant and agreeable to working with therapy; in chair upon arrival, denies pain         Social/Functional History  Social/Functional History  Lives With: Daughter  Type of Home: House  Home Layout: Multi-level, Able to Live on Main level with bedroom/bathroom (bed and bath on main level, kitchen and living space up 3 steps)  Home Access: Stairs to enter with rails  Entrance Stairs - Number of Steps: 3 & 1& 1 & 3 or full flight from basement with main level with rail  Entrance Stairs - Rails: None  Bathroom Shower/Tub: Tub/Shower unit, Walk-in shower, Shower chair with back (walk in shower by pt with Shower chair)  Bathroom Toilet: Standard  Bathroom Equipment: Grab bars in shower, Shower chair, 3-in-1 commode  Bathroom Accessibility: Accessible  Home Equipment: edie Powers  Has the patient had two or more falls in the past year or any fall with injury in the past year?: Yes  Receives Help From: Family  ADL Assistance: Independent (dtr reports she showers by herself; family assists with hair)  Homemaking Assistance: Needs assistance (Dtr cooks, family assists with cleaning)  Ambulation Assistance: Needs assistance (cane PRN, RW if back is bothering her, holds onto family in community)  Transfer Assistance: Independent  Active : No  Leisure & Hobbies: crosswords, puzzles, nimco, facebook  Additional Comments: 3 steps up with B rails to kitchen area and family room, there is a RW on each level, has a cane for patio             Objective      Bed mobility  Bed Mobility Comments: not tested- up in chair at beginning and end of session    Transfers  Sit to Stand: Contact guard assistance;Stand by assistance  Stand to Sit: Contact guard assistance;Stand by assistance  Comment: cues for hand placement, extra time    Ambulation  Surface: Level tile  Device: Rolling Walker  Other Apparatus: O2 (2L 02; PT assisted with line management when needed)  Assistance: Contact guard assistance  Quality of Gait: slow pace, forward flexed posture, cues to stay within the walker base, frequent cues for line mangement with only intermittent assist from PT for line management, moderate SAMUEL, no LOB  Distance: approx 40'x2  Comments: Sp02 82% after ambulation and up to low 90's after several minutes with cues for pursed lip breathing and seated rest  More Ambulation?: No  Stairs/Curb  Stairs?: No     Balance  Posture: Fair  Sitting - Static: Good  Sitting - Dynamic: Good  Standing - Static: Fair  Standing - Dynamic: Fair  Exercise Treatment: seated exercises: x10 alternating marches, x10 alternating LAQs, x20 ankle pumps ofelia; moderate SAMUEL- required seated rest between exercises        AM-PAC Score  AM-PAC Inpatient Mobility Raw Score : 18 (10/14/22 1306)  AM-PAC Inpatient T-Scale Score : 43.63 (10/14/22 1306)  Mobility Inpatient CMS 0-100% Score: 46.58 (10/14/22 1306)  Mobility Inpatient CMS G-Code Modifier : CK (10/14/22 1306)             Goals  Short Term Goals  Time Frame for Short Term Goals: by discharge (goals ongoing as of 10/14)  Short Term Goal 1: bed mob MI  Short Term Goal 2: transfers MI  Short Term Goal 3: amb 48' with RW SBA while maintaining his O2 sats >90%  Patient Goals   Patient Goals : to return home       Education  Patient Education  Education Given To: Patient; Family  Education Provided: Role of Therapy  Education Provided Comments: pursed lip breathing, 02 line management, purpose of home PT  Education Method: Verbal;Demonstration  Education Outcome: Verbalized understanding;Continued education needed      Therapy Time   Individual Concurrent Group Co-treatment   Time In 1201         Time Out 1240         Minutes 39         Timed Code Treatment Minutes: 39 Minutes       Electronically signed by García Torres, PT [id number] on 10/14/2022 at 1:11 PM

## 2022-10-14 NOTE — PROGRESS NOTES
[facility]    Respiratory Therapy   Home Oxygen Evaluation        Name: Alfredo Quiroz  Medical Record Number: [id number]  Age: 80 y.o. Gender:  female   : 3/2/1933  Today's date: 10/14/2022  Room: O7E-4576/5122-01      Assessment        BP (!) 150/80   Pulse (!) 102   Temp 97.5 °F (36.4 °C) (Oral)   Resp 18   Ht 5' 2\" (1.575 m)   Wt 183 lb 13.8 oz (83.4 kg)   SpO2 95%   BMI 33.63 kg/m²     Patient Active Problem List   Diagnosis    Closed fracture of proximal end of left humerus, initial encounter    Acute respiratory failure (HCC)       Social History:  Social History     Tobacco Use    Smoking status: Former     Types: Cigarettes     Quit date:      Years since quittin.8    Smokeless tobacco: Never   Vaping Use    Vaping Use: Never used   Substance Use Topics    Alcohol use: Not Currently    Drug use: Not Currently        Patient Room Air saturation at rest 91  %  Patient Room Air saturation upon ambulation 82 %    Oxygen saturations of 88% or less on RA qualifies patient for Home Oxygen    Patient resting on 2  lmp  with an oxygen saturation of  95 %     Patient ambulated on 1 lpm with an oxygen saturation of 85%    Patient ambulated on 2 lpm with an oxygen saturation of 89%    Qualifying patient for home oxygen with ambulation and continuous flow  @ 2 lpm.      In your clinical assessment does the Patient Require Portable Oxygen Tanks?     Yes              [facility] [employer] contacted to follow care of patients home oxygen needs on 10/14/2022 at 10:05 AM    Patient/caregiver was educated on Home Oxygen process:  Yes      Level of patient/caregiver understanding able to:   [x] Verbalize understanding   [] Demonstrate understanding       [] Teach back        [] Needs reinforcement        []  No available caregiver               []  Other:       Response to education:  Good     Time Spent with Home O2 Set Up:  30  minutes     Branden Zaidi RCP on 10/14/2022 at 10:05 AM

## 2022-10-17 NOTE — DISCHARGE SUMMARY
Hospital Medicine Discharge Summary      Patient ID: Vadim Canchola , [id number]     Patient's PCP: Doretha Velasco Date: 10/9/2022     Discharge Date: 10/14/2022      Admitting Physician: Mayo Smallwood MD    Discharge Physician: Shereen Daly MD     Discharge Diagnoses: Active Hospital Problems    Diagnosis Date Noted    Acute respiratory failure ([facility] [address] [J96.00] 10/09/2022     Priority: Medium         The patient was seen and examined on the day of discharge and this discharge summary is in conjunction with any daily progress note from day of discharge. HOSPITAL COURSE    Patient demographics:  The patient  Vadim Canchola is a 80 y.o. female      Significant past medical history:       Patient Active Problem List   Diagnosis    Closed fracture of proximal end of left humerus, initial encounter    Acute respiratory failure ([facility] [address]            Presenting symptoms:  Shortness of breath     Diagnostic workup:   XR SHOULDER LEFT       CONSULTS DURING ADMISSION :   IP CONSULT TO PULMONOLOGY  IP CONSULT TO SOCIAL WORK  IP CONSULT TO Lia        Patient was diagnosed with:  Acute respiratory failure with pneumonia  Pneumonia  Rheumatoid arthritis  Rheumatoid arthritis related pulmonary fibrosis     Treatment while inpatient:  Patient presented to the emergency room cough fever production of sputum and shortness of breath. 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 Patient was diagnosed with   acute respiratory failure with hypoxia due to community-acquired pneumonia. Patient is and taking methotrexate and low-dose prednisone at home. Patient's methotrexate was held and patient's prednisone dose was increased. Patient will be discharged home on tapering dose of prednisone. Patient will continue taking her home dose once dose is tapered in 10 days.                   Patient will resume methotrexate at home dose on Monday the  17 October. Due to worsening pulmonary fibrosis patient will be discharged on home oxygen. Discharge Condition:  stable     Discharged to:  Home     Activity:   as tolerated: Follow Up: Follow-up with pulmonology in 1 to 2 weeks. Labs: For convenience and continuity at follow-up the following most recent labs are provided:      CBC:   Lab Results   Component Value Date/Time    WBC 11.7 10/13/2022 05:57 AM    HGB 10.3 10/13/2022 05:57 AM    HCT 30.4 10/13/2022 05:57 AM     10/13/2022 05:57 AM       RENAL:   Lab Results   Component Value Date/Time     10/13/2022 05:57 AM    K 4.6 10/13/2022 05:57 AM    K 4.2 10/10/2022 05:33 AM     10/13/2022 05:57 AM    CO2 26 10/13/2022 05:57 AM    BUN 21 10/13/2022 05:57 AM    CREATININE 0.6 10/13/2022 05:57 AM           Discharge Medications:      Medication List        START taking these medications      levoFLOXacin 500 MG tablet  Commonly known as: LEVAQUIN  Take 1 tablet by mouth daily for 3 doses     nystatin 359726 UNIT/ML suspension  Commonly known as: MYCOSTATIN  Take 5 mLs by mouth 4 times daily     zinc sulfate 220 (50 Zn) MG capsule  Commonly known as: ZINCATE  Take 1 capsule by mouth daily  Replaces: zinc gluconate 50 MG tablet            CHANGE how you take these medications      * predniSONE 20 MG tablet  Commonly known as: DELTASONE  Take 2 tablets by mouth daily for 2 doses  What changed: You were already taking a medication with the same name, and this prescription was added. Make sure you understand how and when to take each. * predniSONE 10 MG tablet  Commonly known as: DELTASONE  Take 3 tablets by mouth daily for 2 doses  What changed: You were already taking a medication with the same name, and this prescription was added. Make sure you understand how and when to take each.      * predniSONE 20 MG tablet  Commonly known as: DELTASONE  Take 1 tablet by mouth daily for 2 doses  Start taking on: October 19, 2022  What changed: You were already taking a medication with the same name, and this prescription was added. Make sure you understand how and when to take each. * predniSONE 10 MG tablet  Commonly known as: DELTASONE  Take 1 tablet by mouth daily for 2 doses  Start taking on: October 21, 2022  What changed: You were already taking a medication with the same name, and this prescription was added. Make sure you understand how and when to take each. * predniSONE 2.5 MG tablet  Commonly known as: DELTASONE  Take 1 tablet by mouth every other day  Start taking on: October 23, 2022  What changed: These instructions start on October 23, 2022. If you are unsure what to do until then, ask your doctor or other care provider. * ProAir RespiClick 986 (90 Base) MCG/ACT aerosol powder inhalation  Generic drug: albuterol sulfate  What changed: Another medication with the same name was added. Make sure you understand how and when to take each. * albuterol (2.5 MG/3ML) 0.083% nebulizer solution  Commonly known as: PROVENTIL  Take 3 mLs by nebulization every 6 hours as needed for Wheezing  What changed: You were already taking a medication with the same name, and this prescription was added. Make sure you understand how and when to take each. * This list has 7 medication(s) that are the same as other medications prescribed for you. Read the directions carefully, and ask your doctor or other care provider to review them with you.                 CONTINUE taking these medications      acetaminophen 650 MG extended release tablet  Commonly known as: TYLENOL     Calcium Carb-Cholecalciferol 500-125 MG-UNIT Tabs     cetirizine 10 MG tablet  Commonly known as: ZYRTEC     docusate sodium 100 MG capsule  Commonly known as: COLACE     ferrous sulfate 325 (65 Fe) MG tablet  Commonly known as: IRON 325     fluticasone-salmeterol 250-50 MCG/DOSE Aepb  Commonly known as: ADVAIR     folic acid 1 MG tablet  Commonly known as: FOLVITE     hydroCHLOROthiazide 12.5 MG capsule  Commonly known as: MICROZIDE     methotrexate 2.5 MG chemo tablet  Commonly known as: RHEUMATREX     * therapeutic multivitamin-minerals tablet     * OCUVITE PO     vitamin C 500 MG tablet  Commonly known as: ASCORBIC ACID     vitamin D 25 MCG (1000 UT) Tabs tablet  Commonly known as: CHOLECALCIFEROL           * This list has 2 medication(s) that are the same as other medications prescribed for you. Read the directions carefully, and ask your doctor or other care provider to review them with you. STOP taking these medications      zinc gluconate 50 MG tablet  Replaced by: zinc sulfate 220 (50 Zn) MG capsule               Where to Get Your Medications        These medications were sent to [facility] [id number] [address]. [facility] [phone] [facility] [phone]  [address] [place], [place] [id number]      Phone: [phone]   albuterol (2.5 MG/3ML) 0.083% nebulizer solution  levoFLOXacin 500 MG tablet  nystatin [id number] UNIT/ML suspension  predniSONE 10 MG tablet  predniSONE 10 MG tablet  predniSONE 20 MG tablet  predniSONE 20 MG tablet       You can get these medications from any pharmacy    Bring a paper prescription for each of these medications  predniSONE 2.5 MG tablet  You don't need a prescription for these medications  zinc sulfate 220 (50 Zn) MG capsule            Time Spent on discharge is more than 30 min in the examination, evaluation, counseling and review of medications and discharge plan. Signed:  Gibran Raya MD   10/17/2022      Thank you Mauricio Aguilera for the opportunity to be involved in this patient's care. If you have any questions or concerns please feel free to contact me at [phone]. This note was transcribed using [id number]. Please disregard any translational errors.